# Patient Record
Sex: FEMALE | Race: WHITE | NOT HISPANIC OR LATINO | Employment: PART TIME | URBAN - METROPOLITAN AREA
[De-identification: names, ages, dates, MRNs, and addresses within clinical notes are randomized per-mention and may not be internally consistent; named-entity substitution may affect disease eponyms.]

---

## 2007-06-29 LAB
EXTERNAL HIV CONFIRMATION: NORMAL
EXTERNAL HIV SCREEN: NORMAL

## 2019-04-24 ENCOUNTER — OFFICE VISIT (OUTPATIENT)
Dept: GYNECOLOGY | Facility: CLINIC | Age: 34
End: 2019-04-24
Payer: COMMERCIAL

## 2019-04-24 VITALS
HEART RATE: 81 BPM | WEIGHT: 109 LBS | BODY MASS INDEX: 18.61 KG/M2 | SYSTOLIC BLOOD PRESSURE: 100 MMHG | DIASTOLIC BLOOD PRESSURE: 62 MMHG | HEIGHT: 64 IN

## 2019-04-24 DIAGNOSIS — Z01.419 ENCNTR FOR GYN EXAM (GENERAL) (ROUTINE) W/O ABN FINDINGS: Primary | ICD-10-CM

## 2019-04-24 DIAGNOSIS — Z30.011 ENCOUNTER FOR PRESCRIPTION OF ORAL CONTRACEPTIVES: ICD-10-CM

## 2019-04-24 PROCEDURE — G0145 SCR C/V CYTO,THINLAYER,RESCR: HCPCS | Performed by: NURSE PRACTITIONER

## 2019-04-24 PROCEDURE — 99385 PREV VISIT NEW AGE 18-39: CPT | Performed by: NURSE PRACTITIONER

## 2019-04-24 PROCEDURE — 87624 HPV HI-RISK TYP POOLED RSLT: CPT | Performed by: NURSE PRACTITIONER

## 2019-04-24 RX ORDER — DROSPIRENONE AND ETHINYL ESTRADIOL 0.02-3(28)
1 KIT ORAL DAILY
Qty: 84 TABLET | Refills: 3 | Status: SHIPPED | OUTPATIENT
Start: 2019-04-24 | End: 2020-08-20 | Stop reason: ALTCHOICE

## 2019-04-24 RX ORDER — CETIRIZINE HYDROCHLORIDE 10 MG/1
10 TABLET, CHEWABLE ORAL DAILY
COMMUNITY
End: 2020-04-27

## 2019-04-24 RX ORDER — DROSPIRENONE AND ETHINYL ESTRADIOL 0.02-3(28)
1 KIT ORAL DAILY
COMMUNITY
End: 2019-04-24 | Stop reason: SDUPTHER

## 2019-04-24 RX ORDER — LEVOTHYROXINE SODIUM 0.03 MG/1
25 TABLET ORAL
COMMUNITY
Start: 2018-08-09 | End: 2019-07-01 | Stop reason: SDUPTHER

## 2019-04-26 LAB
HPV HR 12 DNA CVX QL NAA+PROBE: NEGATIVE
HPV16 DNA CVX QL NAA+PROBE: NEGATIVE
HPV18 DNA CVX QL NAA+PROBE: NEGATIVE

## 2019-04-30 LAB
LAB AP GYN PRIMARY INTERPRETATION: NORMAL
Lab: NORMAL

## 2019-07-01 ENCOUNTER — LAB (OUTPATIENT)
Dept: LAB | Facility: CLINIC | Age: 34
End: 2019-07-01
Payer: COMMERCIAL

## 2019-07-01 ENCOUNTER — OFFICE VISIT (OUTPATIENT)
Dept: ENDOCRINOLOGY | Facility: CLINIC | Age: 34
End: 2019-07-01
Payer: COMMERCIAL

## 2019-07-01 VITALS
HEART RATE: 60 BPM | BODY MASS INDEX: 19.09 KG/M2 | DIASTOLIC BLOOD PRESSURE: 60 MMHG | WEIGHT: 111.8 LBS | HEIGHT: 64 IN | SYSTOLIC BLOOD PRESSURE: 100 MMHG

## 2019-07-01 DIAGNOSIS — E89.0 POSTOPERATIVE HYPOTHYROIDISM: Primary | ICD-10-CM

## 2019-07-01 DIAGNOSIS — C73 THYROID CANCER (HCC): ICD-10-CM

## 2019-07-01 DIAGNOSIS — E89.0 POSTOPERATIVE HYPOTHYROIDISM: ICD-10-CM

## 2019-07-01 LAB
T4 FREE SERPL-MCNC: 1.12 NG/DL (ref 0.76–1.46)
TSH SERPL DL<=0.05 MIU/L-ACNC: 1.89 UIU/ML (ref 0.36–3.74)

## 2019-07-01 PROCEDURE — 99204 OFFICE O/P NEW MOD 45 MIN: CPT | Performed by: INTERNAL MEDICINE

## 2019-07-01 PROCEDURE — 84439 ASSAY OF FREE THYROXINE: CPT

## 2019-07-01 PROCEDURE — 84443 ASSAY THYROID STIM HORMONE: CPT

## 2019-07-01 PROCEDURE — 36415 COLL VENOUS BLD VENIPUNCTURE: CPT

## 2019-07-01 RX ORDER — LEVOTHYROXINE SODIUM 0.03 MG/1
25 TABLET ORAL DAILY
Qty: 90 TABLET | Refills: 3 | Status: SHIPPED | OUTPATIENT
Start: 2019-07-01 | End: 2020-08-20 | Stop reason: SDUPTHER

## 2019-07-01 NOTE — PROGRESS NOTES
Massimo Dress 29 y o  female MRN: [id number]    Encounter: [id number]      Assessment/Plan     Assessment: This is a 29y o -year-old female with papillary thyroid cancer and postoperative hypothyroidism  Plan:  1  Papillary thyroid cancer status post left hemithyroidectomy-she has been having once a year ultrasound which I have ordered  There has been no evidence of recurrence on the ultrasound  Thyroglobulin level will be unreliable since she still has the right lobe of the thyroid present  2  Postoperative hypothyroidism-continue thyroid hormone supplementation  Check TSH and free T4       CC:   Thyroid cancer and postoperative hypothyroidism    History of Present Illness     HPI:  50-year-old woman with a 1 2 cm papillary thyroid cancer for which she had a left herbie thyroidectomy in March 2016  She did not have a completion thyroidectomy or radioactive iodine therapy  Since then, she has had postoperative hypothyroidism for which she is on levothyroxine 25 mcg per day  She denies any symptoms of hypo or hyperthyroidism  She denies any family history of thyroid cancer or personal history of radiation exposure  Review of Systems   Constitutional: Negative for chills and fever  HENT: Negative for trouble swallowing and voice change  Respiratory: Negative for shortness of breath  Cardiovascular: Negative for chest pain  Gastrointestinal: Negative for constipation, diarrhea, nausea and vomiting  Endocrine: Negative for cold intolerance and heat intolerance  All other systems reviewed and are negative  Historical Information   No past medical history on file    Past Surgical History:   Procedure Laterality Date    THYROID SURGERY       Social History   Social History     Substance and Sexual Activity   Alcohol Use Not Currently     Social History     Substance and Sexual Activity   Drug Use Never     Social History     Tobacco Use   Smoking Status Current Some Day Smoker Smokeless Tobacco Current User   Tobacco Comment    hooka daily in the summer, winter once per week     Family History:   Family History   Problem Relation Age of Onset    No Known Problems Mother     No Known Problems Father     No Known Problems Brother     No Known Problems Daughter     No Known Problems Son     No Known Problems Maternal Grandmother     No Known Problems Maternal Grandfather     No Known Problems Paternal Grandmother     No Known Problems Paternal Grandfather     No Known Problems Brother     No Known Problems Brother        Meds/Allergies   Current Outpatient Medications   Medication Sig Dispense Refill    cetirizine (ZyrTEC) 10 MG chewable tablet Chew 10 mg daily      drospirenone-ethinyl estradiol (BIANCA) 3-0 02 MG per tablet Take 1 tablet by mouth daily 84 tablet 3    levothyroxine 25 mcg tablet Take 25 mcg by mouth       No current facility-administered medications for this visit  Allergies   Allergen Reactions    Acetaminophen Hives    Ciprofloxacin Hives     To be further evaluated by allergy specialist per Dr David Jeffers       Objective   Vitals: Blood pressure 100/60, pulse 60, height 5' 4" (1 626 m), weight 50 7 kg (111 lb 12 8 oz)  Physical Exam   Constitutional: She is oriented to person, place, and time  She appears well-developed and well-nourished  No distress  HENT:   Head: Normocephalic and atraumatic  Mouth/Throat: Oropharynx is clear and moist and mucous membranes are normal  No oropharyngeal exudate  Eyes: Conjunctivae, EOM and lids are normal  Right eye exhibits no discharge  Left eye exhibits no discharge  No scleral icterus  Neck: Neck supple  No thyromegaly present  Left thyroid is absent  Cardiovascular: Normal rate, regular rhythm and normal heart sounds  Exam reveals no gallop and no friction rub  No murmur heard  Pulmonary/Chest: Effort normal and breath sounds normal  No respiratory distress  She has no wheezes  Abdominal: Soft  Bowel sounds are normal  She exhibits no distension  There is no tenderness  Musculoskeletal: Normal range of motion  She exhibits no edema, tenderness or deformity  Lymphadenopathy:        Head (right side): No occipital adenopathy present  Head (left side): No occipital adenopathy present  Right: No supraclavicular adenopathy present  Left: No supraclavicular adenopathy present  Neurological: She is alert and oriented to person, place, and time  No cranial nerve deficit  Skin: Skin is warm and intact  No rash noted  She is not diaphoretic  No erythema  Psychiatric: She has a normal mood and affect  Vitals reviewed  The history was obtained from the review of the chart, patient  I did reviewed the notes from her endocrinologist at [facility] which talks about her thyroid cancer and treatment of postoperative hypothyroidism  Imaging Studies:   Ultrasound of the thyroid done in 2018 shows no evidence of recurrence  I have personally reviewed pertinent reports  Portions of the record may have been created with voice recognition software  Occasional wrong word or "sound a like" substitutions may have occurred due to the inherent limitations of voice recognition software  Read the chart carefully and recognize, using context, where substitutions have occurred

## 2019-07-03 ENCOUNTER — OFFICE VISIT (OUTPATIENT)
Dept: FAMILY MEDICINE CLINIC | Facility: CLINIC | Age: 34
End: 2019-07-03
Payer: COMMERCIAL

## 2019-07-03 ENCOUNTER — TELEPHONE (OUTPATIENT)
Dept: ENDOCRINOLOGY | Facility: CLINIC | Age: 34
End: 2019-07-03

## 2019-07-03 VITALS
WEIGHT: 110 LBS | BODY MASS INDEX: 18.78 KG/M2 | SYSTOLIC BLOOD PRESSURE: 96 MMHG | HEIGHT: 64 IN | TEMPERATURE: 98.8 F | HEART RATE: 64 BPM | DIASTOLIC BLOOD PRESSURE: 68 MMHG | OXYGEN SATURATION: 96 %

## 2019-07-03 DIAGNOSIS — L85.3 DRY SKIN: ICD-10-CM

## 2019-07-03 DIAGNOSIS — Z00.00 HEALTH CARE MAINTENANCE: Primary | ICD-10-CM

## 2019-07-03 PROCEDURE — 99385 PREV VISIT NEW AGE 18-39: CPT | Performed by: FAMILY MEDICINE

## 2019-07-03 NOTE — TELEPHONE ENCOUNTER
----- Message from Iam Pena MD sent at 7/3/2019  9:27 AM EDT -----  The laboratory testing results are normal

## 2019-07-03 NOTE — PROGRESS NOTES
Assessment/Plan: pt here today for yearly PE     No problem-specific Assessment & Plan notes found for this encounter  Diagnoses and all orders for this visit:    Health care maintenance    Dry skin          Subjective:      Patient ID: Albertina Garcia is a 29 y o  female  First visit   works for [employer], [employer]  The following portions of the patient's history were reviewed and updated as appropriate: allergies, current medications, past family history, past medical history, past social history, past surgical history and problem list     Current Outpatient Medications:     cetirizine (ZyrTEC) 10 MG chewable tablet, Chew 10 mg daily, Disp: , Rfl:     drospirenone-ethinyl estradiol (BIANCA) 3-0 02 MG per tablet, Take 1 tablet by mouth daily, Disp: 84 tablet, Rfl: 3    levothyroxine 25 mcg tablet, Take 1 tablet (25 mcg total) by mouth daily, Disp: 90 tablet, Rfl: 3     Allergies   Allergen Reactions    Acetaminophen Hives    Ciprofloxacin Hives     To be further evaluated by allergy specialist per Dr Nnamdi Zavala       Review of Systems   Constitutional: Negative  HENT: Negative  Eyes: Negative  Respiratory: Negative  Cardiovascular: Negative  Gastrointestinal: Negative  Genitourinary: Negative  Musculoskeletal: Negative  Skin: Negative  Dry skin   Neurological: Negative  Psychiatric/Behavioral: Negative  Objective:      BP 96/68 (BP Location: Left arm, Patient Position: Sitting, Cuff Size: Standard)   Pulse 64   Temp 98 8 °F (37 1 °C) (Oral)   Ht 5' 4" (1 626 m)   Wt 49 9 kg (110 lb)   LMP 06/17/2019   SpO2 96%   BMI 18 88 kg/m²          Physical Exam   Constitutional: She is oriented to person, place, and time  She appears well-developed and well-nourished  HENT:   Head: Normocephalic and atraumatic     Right Ear: External ear normal    Left Ear: External ear normal    Nose: Nose normal    Mouth/Throat: Oropharynx is clear and moist    Eyes: Pupils are equal, round, and reactive to light  Conjunctivae and EOM are normal    Neck: Normal range of motion  Neck supple  Cardiovascular: Normal rate, regular rhythm, normal heart sounds and intact distal pulses  Pulmonary/Chest: Effort normal and breath sounds normal    Abdominal: Soft  Bowel sounds are normal    Neurological: She is alert and oriented to person, place, and time  She has normal reflexes  Skin: Skin is warm and dry  Skin little too dry  Psychiatric: She has a normal mood and affect   Her behavior is normal  Judgment and thought content normal

## 2019-08-08 ENCOUNTER — HOSPITAL ENCOUNTER (OUTPATIENT)
Dept: ULTRASOUND IMAGING | Facility: HOSPITAL | Age: 34
Discharge: HOME | End: 2019-08-08
Attending: INTERNAL MEDICINE
Payer: COMMERCIAL

## 2019-08-08 DIAGNOSIS — C73 THYROID CANCER (HCC): ICD-10-CM

## 2019-08-08 DIAGNOSIS — E89.0 POSTOPERATIVE HYPOTHYROIDISM: ICD-10-CM

## 2019-08-08 PROCEDURE — 76536 US EXAM OF HEAD AND NECK: CPT

## 2019-08-15 ENCOUNTER — TELEPHONE (OUTPATIENT)
Dept: ENDOCRINOLOGY | Facility: CLINIC | Age: 34
End: 2019-08-15

## 2019-08-15 NOTE — TELEPHONE ENCOUNTER
----- Message from Tally Leventhal, MD sent at 8/15/2019  7:52 AM EDT -----  Please call the patient regarding her abnormal result  There is no evidence of thyroid cancer on this ultrasound

## 2019-08-15 NOTE — RESULT ENCOUNTER NOTE
Please call the patient regarding her abnormal result  There is no evidence of thyroid cancer on this ultrasound

## 2019-09-10 ENCOUNTER — APPOINTMENT (OUTPATIENT)
Dept: LAB | Age: 34
End: 2019-09-10

## 2019-09-10 ENCOUNTER — TRANSCRIBE ORDERS (OUTPATIENT)
Dept: ADMINISTRATIVE | Facility: HOSPITAL | Age: 34
End: 2019-09-10

## 2019-09-10 DIAGNOSIS — Z00.8 HEALTH EXAMINATION IN POPULATION SURVEYS: Primary | ICD-10-CM

## 2019-09-10 DIAGNOSIS — Z00.8 HEALTH EXAMINATION IN POPULATION SURVEYS: ICD-10-CM

## 2019-09-10 LAB
CHOLEST SERPL-MCNC: 185 MG/DL (ref 50–200)
EST. AVERAGE GLUCOSE BLD GHB EST-MCNC: 94 MG/DL
HBA1C MFR BLD: 4.9 % (ref 4.2–6.3)
HDLC SERPL-MCNC: 72 MG/DL (ref 40–60)
LDLC SERPL CALC-MCNC: 100 MG/DL (ref 0–100)
NONHDLC SERPL-MCNC: 113 MG/DL
TRIGL SERPL-MCNC: 66 MG/DL

## 2019-09-10 PROCEDURE — 80061 LIPID PANEL: CPT

## 2019-09-10 PROCEDURE — 83036 HEMOGLOBIN GLYCOSYLATED A1C: CPT

## 2019-09-10 PROCEDURE — 36415 COLL VENOUS BLD VENIPUNCTURE: CPT

## 2019-10-16 ENCOUNTER — OFFICE VISIT (OUTPATIENT)
Dept: FAMILY MEDICINE CLINIC | Facility: CLINIC | Age: 34
End: 2019-10-16
Payer: COMMERCIAL

## 2019-10-16 VITALS
DIASTOLIC BLOOD PRESSURE: 72 MMHG | RESPIRATION RATE: 15 BRPM | WEIGHT: 111.6 LBS | SYSTOLIC BLOOD PRESSURE: 108 MMHG | BODY MASS INDEX: 19.05 KG/M2 | TEMPERATURE: 98.7 F | OXYGEN SATURATION: 98 % | HEART RATE: 81 BPM | HEIGHT: 64 IN

## 2019-10-16 DIAGNOSIS — R42 ORTHOSTATIC DIZZINESS: Primary | ICD-10-CM

## 2019-10-16 DIAGNOSIS — R42 LIGHT-HEADED FEELING: ICD-10-CM

## 2019-10-16 PROCEDURE — 3008F BODY MASS INDEX DOCD: CPT | Performed by: FAMILY MEDICINE

## 2019-10-16 PROCEDURE — 99213 OFFICE O/P EST LOW 20 MIN: CPT | Performed by: FAMILY MEDICINE

## 2019-10-16 NOTE — PROGRESS NOTES
Chief Complaint   Patient presents with    Letter for School/Work     would like excuse stating she can only work in her department becuase of dizziness when running around       Assessment/Plan:  Will give note for one month with no repetitive bending and lifting  Cardiology evaluation  Diagnoses and all orders for this visit:    Orthostatic dizziness  -     Ambulatory referral to Cardiology; Future    Light-headed feeling          Subjective:      Patient ID: Peter Davis is a 29 y o  female  Here for dizziness with bending over and picking things up, repetitively  Very difficult historian of when exactly started  Bending over and picking things up patient gets lightheaded  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  The following portions of the patient's history were reviewed and updated as appropriate: allergies, current medications, past family history, past medical history, past social history, past surgical history and problem list     Review of Systems   Constitutional: Negative  HENT: Negative  Eyes: Negative  Respiratory: Negative  Negative for apnea, chest tightness and shortness of breath  Cardiovascular: Negative  Negative for chest pain, palpitations and leg swelling  Gastrointestinal: Negative  Genitourinary: Negative  Musculoskeletal: Negative  Skin: Negative  Neurological:        Gets light headed with repetitive bending and lifting  Psychiatric/Behavioral: Negative            Objective:      /72 (BP Location: Left arm, Patient Position: Sitting, Cuff Size: Standard)   Pulse 81   Temp 98 7 °F (37 1 °C) (Oral)   Resp 15   Ht 5' 4" (1 626 m)   Wt 50 6 kg (111 lb 9 6 oz)   SpO2 98%   BMI 19 16 kg/m²       Current Outpatient Medications:     cetirizine (ZyrTEC) 10 MG chewable tablet, Chew 10 mg daily, Disp: , Rfl:     drospirenone-ethinyl estradiol (BIANCA) 3-0 02 MG per tablet, Take 1 tablet by mouth daily, Disp: 84 tablet, Rfl: 3    levothyroxine 25 mcg tablet, Take 1 tablet (25 mcg total) by mouth daily, Disp: 90 tablet, Rfl: 3     Physical Exam

## 2019-12-09 ENCOUNTER — OFFICE VISIT (OUTPATIENT)
Dept: GYNECOLOGY | Facility: CLINIC | Age: 34
End: 2019-12-09
Payer: COMMERCIAL

## 2019-12-09 VITALS
SYSTOLIC BLOOD PRESSURE: 94 MMHG | BODY MASS INDEX: 19.47 KG/M2 | WEIGHT: 113.4 LBS | DIASTOLIC BLOOD PRESSURE: 70 MMHG | HEART RATE: 90 BPM

## 2019-12-09 DIAGNOSIS — N90.89 VULVAR IRRITATION: Primary | ICD-10-CM

## 2019-12-09 PROCEDURE — 99214 OFFICE O/P EST MOD 30 MIN: CPT | Performed by: NURSE PRACTITIONER

## 2019-12-09 PROCEDURE — 87210 SMEAR WET MOUNT SALINE/INK: CPT | Performed by: NURSE PRACTITIONER

## 2019-12-09 RX ORDER — NYSTATIN AND TRIAMCINOLONE ACETONIDE 100000; 1 [USP'U]/G; MG/G
OINTMENT TOPICAL 2 TIMES DAILY
Qty: 30 G | Refills: 0 | Status: SHIPPED | OUTPATIENT
Start: 2019-12-09 | End: 2020-04-27

## 2019-12-09 NOTE — PATIENT INSTRUCTIONS
Use ointment to affected area  Encourage  to discontinue powder to groin area or shower before sex to limit exposure  Bathe daily with dove bar soap  Call with any recurrence of symptoms

## 2019-12-09 NOTE — PROGRESS NOTES
Assessment/Plan:     Use ointment to affected area  Encourage  to discontinue powder to groin area or shower before sex to limit exposure  Bathe daily with dove bar soap  Call with any recurrence of symptoms  Diagnoses and all orders for this visit:    Vulvar irritation  -     POCT wet mount  -     nystatin-triamcinolone (MYCOLOG-II) ointment; Apply topically 2 (two) times a day              Subjective:      Patient ID: Alla Andres is a 36 y o  female  Alla Andres is a 36 y o  female who is here today for problem visit  She has had external vaginal itching x 5 weeks  She is using monistat external cream to help resolve the itching  If she discontinues the monistat, the itching immediately returns  Denies new soaps of detergents  No health concerns  Monthly menses x 4 days with light flow  Menses is acceptable  Alla Andres is sexually active with   Her vulvar irritation worsens after sex  Denies using any vaginal lubricants  C/o light yellow vaginal discharge x 3-4 weeks  The following portions of the patient's history were reviewed and updated as appropriate: allergies, current medications, past family history, past medical history, past social history, past surgical history and problem list     Review of Systems   Constitutional: Negative  Gastrointestinal: Negative for abdominal pain, constipation, diarrhea, nausea and vomiting  Genitourinary: Positive for vaginal discharge  Negative for decreased urine volume, dyspareunia, dysuria, genital sores, menstrual problem, pelvic pain, urgency, vaginal bleeding and vaginal pain  Musculoskeletal: Negative for arthralgias and myalgias  Skin: Negative  Hematological: Negative for adenopathy  Psychiatric/Behavioral: Negative  All other systems reviewed and are negative          Objective:      BP 94/70 (BP Location: Left arm, Patient Position: Sitting, Cuff Size: Standard)   Pulse 90   Wt 51 4 kg (113 lb 6 4 oz) LMP 11/15/2019   BMI 19 47 kg/m²          Physical Exam   Constitutional: She is oriented to person, place, and time  She appears well-developed and well-nourished  Genitourinary: Vagina normal and uterus normal  Rectal exam shows no external hemorrhoid  Pelvic exam was performed with patient supine  No labial fusion  There is tenderness on the right labia  There is no rash, lesion or injury on the right labia  There is tenderness on the left labia  There is no rash, lesion or injury on the left labia  Cervix exhibits no motion tenderness, no discharge and no friability  Right adnexum displays no mass, no tenderness and no fullness  Left adnexum displays no mass, no tenderness and no fullness  Genitourinary Comments: Tenderness with moist appearing skin as noted on diagram  Linear excoriation at base of vagina   Musculoskeletal: Normal range of motion  Lymphadenopathy: No inguinal adenopathy noted on the right or left side  Right: No inguinal adenopathy present  Left: No inguinal adenopathy present  Neurological: She is alert and oriented to person, place, and time  Skin: Skin is warm and dry  Psychiatric: She has a normal mood and affect  Nursing note and vitals reviewed

## 2020-01-07 ENCOUNTER — OFFICE VISIT (OUTPATIENT)
Dept: FAMILY MEDICINE CLINIC | Facility: CLINIC | Age: 35
End: 2020-01-07
Payer: COMMERCIAL

## 2020-01-07 VITALS
DIASTOLIC BLOOD PRESSURE: 72 MMHG | HEIGHT: 64 IN | TEMPERATURE: 99 F | SYSTOLIC BLOOD PRESSURE: 94 MMHG | BODY MASS INDEX: 19.33 KG/M2 | HEART RATE: 75 BPM | OXYGEN SATURATION: 99 % | RESPIRATION RATE: 16 BRPM | WEIGHT: 113.2 LBS

## 2020-01-07 DIAGNOSIS — N89.8 VAGINAL ITCHING: Primary | ICD-10-CM

## 2020-01-07 DIAGNOSIS — L85.3 DRY SKIN DERMATITIS: ICD-10-CM

## 2020-01-07 PROCEDURE — 99213 OFFICE O/P EST LOW 20 MIN: CPT | Performed by: FAMILY MEDICINE

## 2020-01-07 RX ORDER — FLUCONAZOLE 150 MG/1
150 TABLET ORAL ONCE
Qty: 1 TABLET | Refills: 1 | Status: SHIPPED | OUTPATIENT
Start: 2020-01-07 | End: 2020-01-07

## 2020-01-07 NOTE — PROGRESS NOTES
Chief Complaint   Patient presents with    Rash     has on arms, abdomen, and back, feels itchy    Vaginal Itching       Assessment/Plan:  Reviewed dry skin care  Discussed soaps on dry skin and long hot showers  Diagnoses and all orders for this visit:    Vaginal itching  -     fluconazole (DIFLUCAN) 150 mg tablet; Take 1 tablet (150 mg total) by mouth once for 1 dose Can repeat in 3 - 4 days if needed  Dry skin dermatitis          Subjective:      Patient ID: Vahe Saleh is a 36 y o  female  Dry itch spots on skin, head to toe  Changed body wasy 3 weeks ago and riash with itching started 2 weeks ago  Also complained of vaginal itching, history of yeast infections  Also washes up inside with soap  SH: Lives and sleeps with  who has no rash  The following portions of the patient's history were reviewed and updated as appropriate: allergies, current medications, past medical history, past social history and problem list     Review of Systems   Constitutional: Negative  HENT: Negative  Eyes: Negative  Respiratory: Negative  Cardiovascular: Negative  Gastrointestinal: Negative  Genitourinary: Negative  Negative for vaginal bleeding, vaginal discharge and vaginal pain  Musculoskeletal: Negative  Skin: Positive for rash  Neurological: Negative  Psychiatric/Behavioral: Negative            Objective:      BP 94/72 (BP Location: Left arm, Patient Position: Sitting, Cuff Size: Standard)   Pulse 75   Temp 99 °F (37 2 °C) (Oral)   Resp 16   Ht 5' 4" (1 626 m)   Wt 51 3 kg (113 lb 3 2 oz)   SpO2 99%   BMI 19 43 kg/m²       Current Outpatient Medications:     cetirizine (ZyrTEC) 10 MG chewable tablet, Chew 10 mg daily, Disp: , Rfl:     drospirenone-ethinyl estradiol (BIANCA) 3-0 02 MG per tablet, Take 1 tablet by mouth daily, Disp: 84 tablet, Rfl: 3    levothyroxine 25 mcg tablet, Take 1 tablet (25 mcg total) by mouth daily, Disp: 90 tablet, Rfl: 3   nystatin-triamcinolone (MYCOLOG-II) ointment, Apply topically 2 (two) times a day (Patient not taking: Reported on 1/7/2020), Disp: 30 g, Rfl: 0     Physical Exam   Constitutional: She is oriented to person, place, and time  She appears well-developed and well-nourished  HENT:   Head: Normocephalic and atraumatic  Right Ear: External ear normal    Left Ear: External ear normal    Nose: Nose normal    Mouth/Throat: Oropharynx is clear and moist    Eyes: Pupils are equal, round, and reactive to light  Conjunctivae and EOM are normal    Neck: Normal range of motion  Neck supple  Cardiovascular: Normal rate, regular rhythm, normal heart sounds and intact distal pulses  Pulmonary/Chest: Effort normal and breath sounds normal    Abdominal: Soft  Bowel sounds are normal    Neurological: She is alert and oriented to person, place, and time  She has normal reflexes  Skin: Skin is warm and dry  Rash noted  Small red areas of irritated skin  Skin is too dry  Psychiatric: She has a normal mood and affect   Her behavior is normal  Judgment and thought content normal

## 2020-01-16 ENCOUNTER — OFFICE VISIT (OUTPATIENT)
Dept: URGENT CARE | Facility: MEDICAL CENTER | Age: 35
End: 2020-01-16
Payer: COMMERCIAL

## 2020-01-16 VITALS
BODY MASS INDEX: 20.69 KG/M2 | TEMPERATURE: 97.6 F | RESPIRATION RATE: 18 BRPM | HEIGHT: 62 IN | DIASTOLIC BLOOD PRESSURE: 72 MMHG | WEIGHT: 112.43 LBS | HEART RATE: 88 BPM | OXYGEN SATURATION: 99 % | SYSTOLIC BLOOD PRESSURE: 109 MMHG

## 2020-01-16 DIAGNOSIS — R21 RASH: Primary | ICD-10-CM

## 2020-01-16 PROCEDURE — 99213 OFFICE O/P EST LOW 20 MIN: CPT | Performed by: PHYSICIAN ASSISTANT

## 2020-01-16 RX ORDER — METHYLPREDNISOLONE 4 MG/1
TABLET ORAL
Qty: 1 EACH | Refills: 0 | Status: SHIPPED | OUTPATIENT
Start: 2020-01-16 | End: 2020-04-27

## 2020-01-17 NOTE — PATIENT INSTRUCTIONS
Take steroid as prescribed  Use steroid cream  Take 1 tablet of diflucan  Follow up with PCP in 5-7 days after steroid is done if rash is still there and no better  Follow up with Dermatology for biopsy of rash  Go to the ER if symptoms become severe  Acute Rash   WHAT YOU NEED TO KNOW:   A rash is irritation, redness, or itchiness in the skin or mucus membranes  Mucus membranes are areas such as the lining of your nose or throat  Acute means the rash starts suddenly, worsens quickly, and lasts a short time  An acute rash may be caused by a disease, such as hepatitis or vasculitis  The rash may be a reaction to something you are allergic to, such as certain foods, or latex  Certain medicines, including antibiotics, NSAIDs, prescription pain medicine, and aspirin can also cause a rash  DISCHARGE INSTRUCTIONS:   Return to the emergency department if:   · You have sudden trouble breathing or chest pain  · You are vomiting, have a headache or muscle aches, and your throat hurts  Contact your healthcare provider if:   · You have a fever  · You get open wounds from scratching your skin, or you have a wound that is red, swollen, or painful  · Your rash lasts longer than 3 months  · You have swelling or pain in your joints  · You have questions or concerns about your condition or care  Medicines:  If your rash does not go away on its own, you may need the following medicines:  · Antihistamines  may be given to help decrease itching  · Steroids  may be given to decrease inflammation  · Antibiotics  help fight or prevent a bacterial infection  · Take your medicine as directed  Contact your healthcare provider if you think your medicine is not helping or if you have side effects  Tell him of her if you are allergic to any medicine  Keep a list of the medicines, vitamins, and herbs you take  Include the amounts, and when and why you take them   Bring the list or the pill bottles to follow-up visits  Carry your medicine list with you in case of an emergency  Prevent a rash or care for your skin when you have a rash:  Dry skin can lead to more problems  Do not scratch your skin if it itches  You may cause a skin infection by scratching  The following may prevent dry skin, and help your skin look better:  · Use thick cream lotions or petroleum jelly to help soothe your rash  These products work well on areas with thick skin, such as your feet  Cool compresses may also be used to soothe your skin  Apply a cool compress or a cool, wet towel, and then cover it with a dry towel  · Use lukewarm water when you bathe  Hot water may damage your skin more  Pat your skin dry  Do not rub your skin with a towel  · Use detergents, soaps, shampoos, and bubble baths made for sensitive skin  Wear clothes that are made of cotton instead of nylon or wool  Cotton is softer, so it will not hurt your skin as much  Follow up with your healthcare provider as directed: You may need to see a dermatologist if healthcare providers do not know what is causing your rash  You may also need to see a dermatologist if your rash does not get better even with treatment  You may need to see a dietitian if you have allergies to foods  Write down your questions so you remember to ask them during your visits  © 2017 [employer] Information is for End User's use only and may not be sold, redistributed or otherwise used for commercial purposes  All illustrations and images included in CareNotes® are the copyrighted property of [employer]  or [employer]  The above information is an  only  It is not intended as medical advice for individual conditions or treatments  Talk to your doctor, nurse or pharmacist before following any medical regimen to see if it is safe and effective for you

## 2020-01-17 NOTE — PROGRESS NOTES
[facility] [facility]        NAME: Basil Cannon is a 36 y o  female  : 1985    MRN: [id number]  DATE: 2020  TIME: 10:29 PM    Assessment and Plan   Rash [R21]  1  Rash  methylPREDNISolone 4 MG tablet therapy pack    hydrocortisone 2 5 % cream     Assessment/plan:  1  Rash  - patient has had rash for 3 weeks now  Rash was palpable on skin  Some lesions appear circular with a slight central clearing  Pt denies new detergents, new soaps, different diet, new lotions, etc  Pt states the rash is slightly itchy  A couple ddx come to mind  The first is a contact dermatitis  Second, is pityriasis rosea which is a viral rash and will go away in a couple weeks on its own  Possibly could be pityriasis rosea with the description of one large lesion first then the diffuse rash  Lastly, with the circular lesion, it could be funal like a tinea corporis  Prescribed oral steroids and topical steroids for the patient  Patient has seen 3 different providers for the rash  No one has been able to tell her what it is  Pt also states she has diflucan at home from her OBGYN  Advised pt to take the diflucan in case the rash is fungal  Follow up with PCP in 5-7 days after steroid is done if rash is still there and no better  Follow up with Dermatology for biopsy of rash  Go to the ER if symptoms become severe  Patient Instructions       Follow up with PCP in 3-5 days  Proceed to  ER if symptoms worsen  Chief Complaint     Chief Complaint   Patient presents with    Rash     Scattered rash on trunk legs and arms  Onset 3 weeks ago  Denies pain or itching with rash         History of Present Illness       Rash   This is a new (pt states the rash started with one big spot and then spread to the rest of her body) problem  The current episode started 1 to 4 weeks ago (3 weeks )  The rash is diffuse  The rash is characterized by itchiness and redness  Associated symptoms include shortness of breath   Pertinent negatives include no congestion, cough, diarrhea, facial edema, joint pain, rhinorrhea, sore throat or vomiting  Treatments tried: aveeno  The treatment provided no relief  There is no history of allergies, asthma or eczema  Review of Systems   Review of Systems   HENT: Negative for congestion, ear discharge, ear pain, facial swelling, rhinorrhea and sore throat  Respiratory: Positive for shortness of breath  Negative for apnea, cough, choking, chest tightness, wheezing and stridor  Cardiovascular: Negative for chest pain, palpitations and leg swelling  Gastrointestinal: Negative for abdominal distention, abdominal pain, diarrhea, nausea and vomiting  Musculoskeletal: Negative for joint pain  Skin: Positive for rash (positive for diffuse skin rash)  Negative for color change and pallor  Neurological: Negative for dizziness, syncope, speech difficulty, weakness, light-headedness and headaches           Current Medications       Current Outpatient Medications:     drospirenone-ethinyl estradiol (BIANCA) 3-0 02 MG per tablet, Take 1 tablet by mouth daily, Disp: 84 tablet, Rfl: 3    levothyroxine 25 mcg tablet, Take 1 tablet (25 mcg total) by mouth daily, Disp: 90 tablet, Rfl: 3    cetirizine (ZyrTEC) 10 MG chewable tablet, Chew 10 mg daily, Disp: , Rfl:     hydrocortisone 2 5 % cream, Apply topically 2 (two) times a day, Disp: 30 g, Rfl: 0    methylPREDNISolone 4 MG tablet therapy pack, Use as directed on package, Disp: 1 each, Rfl: 0    nystatin-triamcinolone (MYCOLOG-II) ointment, Apply topically 2 (two) times a day (Patient not taking: Reported on 1/7/2020), Disp: 30 g, Rfl: 0    Current Allergies     Allergies as of 01/16/2020 - Reviewed 01/16/2020   Allergen Reaction Noted    Acetaminophen Hives 04/24/2019    Ciprofloxacin Hives 09/14/2017            The following portions of the patient's history were reviewed and updated as appropriate: allergies, current medications, past family history, past medical history, past social history, past surgical history and problem list      History reviewed  No pertinent past medical history  Past Surgical History:   Procedure Laterality Date    THYROID SURGERY         Family History   Problem Relation Age of Onset    No Known Problems Mother     No Known Problems Father     No Known Problems Brother     No Known Problems Daughter     No Known Problems Son     No Known Problems Maternal Grandmother     No Known Problems Maternal Grandfather     No Known Problems Paternal Grandmother     No Known Problems Paternal Grandfather     No Known Problems Brother     No Known Problems Brother          Medications have been verified  Objective   /72   Pulse 88   Temp 97 6 °F (36 4 °C) (Tympanic)   Resp 18   Ht 5' 2" (1 575 m)   Wt 51 kg (112 lb 7 oz)   LMP 01/12/2020 (Exact Date)   SpO2 99%   BMI 20 56 kg/m²        Physical Exam     Physical Exam   Constitutional: She is oriented to person, place, and time  She appears well-developed and well-nourished  No distress  Cardiovascular: Normal rate, regular rhythm, normal heart sounds and intact distal pulses  No murmur heard  Pulmonary/Chest: Effort normal and breath sounds normal  No stridor  No respiratory distress  She has no wheezes  She has no rales  She exhibits no tenderness  Neurological: She is alert and oriented to person, place, and time  Skin: Skin is warm and dry  Rash noted  No purpura noted  Rash is maculopapular  Rash is not nodular and not vesicular  She is not diaphoretic  No erythema  No pallor  Erythematous, maculopapular rash in circular cluster with slight central clearing and some skin peeling noted in diffuse pattern    Circular clusters were all different sizes  Pt relates the first one was on her wrist and then the rash spread    Rash located on chest, abdomen, whole back, upper inner thighs, and both arms  Psychiatric: She has a normal mood and affect   Her behavior is normal    Nursing note and vitals reviewed

## 2020-03-09 ENCOUNTER — TELEPHONE (OUTPATIENT)
Dept: DERMATOLOGY | Facility: CLINIC | Age: 35
End: 2020-03-09

## 2020-03-09 NOTE — TELEPHONE ENCOUNTER
Mr  Natalie Kam called in for his wife wanting to make an appointment foe her as a new patient   He was told there is a wait list, he asked to place her on that wait list

## 2020-04-13 ENCOUNTER — TELEMEDICINE (OUTPATIENT)
Dept: DERMATOLOGY | Facility: CLINIC | Age: 35
End: 2020-04-13
Payer: COMMERCIAL

## 2020-04-13 DIAGNOSIS — L70.0 ACNE VULGARIS: Primary | ICD-10-CM

## 2020-04-13 PROCEDURE — 99203 OFFICE O/P NEW LOW 30 MIN: CPT | Performed by: STUDENT IN AN ORGANIZED HEALTH CARE EDUCATION/TRAINING PROGRAM

## 2020-04-13 RX ORDER — DOXYCYCLINE HYCLATE 100 MG/1
CAPSULE ORAL
Qty: 60 CAPSULE | Refills: 0 | Status: SHIPPED | OUTPATIENT
Start: 2020-04-13 | End: 2020-04-27

## 2020-04-14 ENCOUNTER — TELEPHONE (OUTPATIENT)
Dept: DERMATOLOGY | Facility: CLINIC | Age: 35
End: 2020-04-14

## 2020-04-27 ENCOUNTER — TELEMEDICINE (OUTPATIENT)
Dept: GYNECOLOGY | Facility: CLINIC | Age: 35
End: 2020-04-27
Payer: COMMERCIAL

## 2020-04-27 VITALS — BODY MASS INDEX: 18.32 KG/M2 | WEIGHT: 114 LBS | HEIGHT: 66 IN

## 2020-04-27 DIAGNOSIS — L29.2 VULVAR ITCHING: ICD-10-CM

## 2020-04-27 DIAGNOSIS — Z91.14 NON COMPLIANCE W MEDICATION REGIMEN: ICD-10-CM

## 2020-04-27 DIAGNOSIS — Z30.09 ENCOUNTER FOR COUNSELING REGARDING CONTRACEPTION: Primary | ICD-10-CM

## 2020-04-27 DIAGNOSIS — N89.8 VAGINAL DISCHARGE: ICD-10-CM

## 2020-04-27 PROBLEM — Z91.148 NON COMPLIANCE W MEDICATION REGIMEN: Status: ACTIVE | Noted: 2020-04-27

## 2020-04-27 PROCEDURE — 99213 OFFICE O/P EST LOW 20 MIN: CPT | Performed by: NURSE PRACTITIONER

## 2020-04-27 RX ORDER — FLUCONAZOLE 150 MG/1
150 TABLET ORAL ONCE
Qty: 1 TABLET | Refills: 0 | Status: SHIPPED | OUTPATIENT
Start: 2020-04-27 | End: 2020-04-27

## 2020-06-11 ENCOUNTER — TELEPHONE (OUTPATIENT)
Dept: ENDOCRINOLOGY | Facility: CLINIC | Age: 35
End: 2020-06-11

## 2020-06-16 ENCOUNTER — TELEPHONE (OUTPATIENT)
Dept: ENDOCRINOLOGY | Facility: CLINIC | Age: 35
End: 2020-06-16

## 2020-07-21 ENCOUNTER — TELEPHONE (OUTPATIENT)
Dept: ENDOCRINOLOGY | Facility: CLINIC | Age: 35
End: 2020-07-21

## 2020-07-21 DIAGNOSIS — C73 THYROID CANCER (HCC): Primary | ICD-10-CM

## 2020-07-23 ENCOUNTER — HOSPITAL ENCOUNTER (OUTPATIENT)
Dept: ULTRASOUND IMAGING | Facility: HOSPITAL | Age: 35
Discharge: HOME | End: 2020-07-23
Attending: INTERNAL MEDICINE
Payer: COMMERCIAL

## 2020-07-23 ENCOUNTER — TELEPHONE (OUTPATIENT)
Dept: ENDOCRINOLOGY | Facility: CLINIC | Age: 35
End: 2020-07-23

## 2020-07-23 DIAGNOSIS — C73 THYROID CANCER (HCC): Primary | ICD-10-CM

## 2020-07-23 DIAGNOSIS — C73 THYROID CANCER (HCC): ICD-10-CM

## 2020-07-23 PROCEDURE — 76536 US EXAM OF HEAD AND NECK: CPT

## 2020-07-27 ENCOUNTER — TELEPHONE (OUTPATIENT)
Dept: ENDOCRINOLOGY | Facility: CLINIC | Age: 35
End: 2020-07-27

## 2020-07-27 NOTE — RESULT ENCOUNTER NOTE
Left thyroid lobe is absent  Right thyroid does not having nodules  There is no evidence of recurrence or metastatic disease in this ultrasound      Sent to my chart

## 2020-07-27 NOTE — TELEPHONE ENCOUNTER
----- Message from Gagan Gifford MD sent at 7/27/2020 12:11 PM EDT -----  Left thyroid lobe is absent  Right thyroid does not having nodules  There is no evidence of recurrence or metastatic disease in this ultrasound      Sent to my chart

## 2020-07-29 ENCOUNTER — OFFICE VISIT (OUTPATIENT)
Dept: DERMATOLOGY | Facility: CLINIC | Age: 35
End: 2020-07-29
Payer: COMMERCIAL

## 2020-07-29 VITALS — BODY MASS INDEX: 20.04 KG/M2 | WEIGHT: 117.4 LBS | HEIGHT: 64 IN | TEMPERATURE: 99.1 F

## 2020-07-29 DIAGNOSIS — L70.0 ACNE VULGARIS: ICD-10-CM

## 2020-07-29 PROCEDURE — 99212 OFFICE O/P EST SF 10 MIN: CPT | Performed by: STUDENT IN AN ORGANIZED HEALTH CARE EDUCATION/TRAINING PROGRAM

## 2020-07-29 NOTE — PROGRESS NOTES
[facility] Follow Up Note    Patient Name: Emmett Mosley  Encounter Date: 7/29/2020    Today's Chief Concerns:  Osmar Concern #1:  Follow up for acne      Current Medications:    Current Outpatient Medications:     levothyroxine 25 mcg tablet, Take 1 tablet (25 mcg total) by mouth daily, Disp: 90 tablet, Rfl: 3    tretinoin (RETIN-A) 0 025 % cream, Apply pea sized amount topically to face one hour before bedtime  , Disp: 45 g, Rfl: 6    drospirenone-ethinyl estradiol (BIANCA) 3-0 02 MG per tablet, Take 1 tablet by mouth daily (Patient not taking: Reported on 7/29/2020), Disp: 84 tablet, Rfl: 3    CONSTITUTIONAL:   Vitals:    07/29/20 1042   Temp: 99 1 °F (37 3 °C)   TempSrc: Tympanic   Weight: 53 3 kg (117 lb 6 4 oz)   Height: 5' 4" (1 626 m)         Specific Alerts:    Have you been seen by a [facility]atologist in the last 3 years? No    Are you pregnant or planning to become pregnant? No    Are you currently or planning to be nursing or breast feeding? No    Allergies   Allergen Reactions    Acetaminophen Hives    Ciprofloxacin Hives     To be further evaluated by allergy specialist per Dr Ash Emmanuel we call your Preferred Phone number to discuss your specific medical information? YES    May we leave a detailed message that includes your specific medical information? YES    Have you traveled outside of the [facility] in the past 3 months? No    Do you currently have a pacemaker or defibrillator? No    Do you have any artificial heart valves, joints, plates, screws, rods, stents, pins, etc? No    Do you require any medications prior to a surgical procedure? No    Are you taking any medications that cause you to bleed more easily ("blood thinners") No    Have you ever experienced a rapid heartbeat with epinephrine? No    Have you ever been treated with "gold" (gold sodium thiomalate) therapy?      [facility] can help with wrinkles, "laugh lines," facial volume loss, "double chin," "love handles," age spots, and more  Are you interested in learning today about some of the skin enhancement procedures that we offer? (If Yes, please provide more detail)     Review of Systems:  Have you recently had or currently have any of the following?     · Fever or chills: No  · Night Sweats: No  · Headaches: YES after she gets her period  · Weight Gain: No  · Weight Loss: No  · Blurry Vision: No  · Nausea: No  · Vomiting: No  · Diarrhea: No  · Blood in Stool: No  · Abdominal Pain: No  · Itchy Skin: No  · Painful Joints: No  · Swollen Joints: No  · Muscle Pain: No  · Irregular Mole: No  · Sun Burn: No  · Dry Skin: No  · Skin Color Changes: No  · Scar or Keloid: No  · Cold Sores/Fever Blisters: No  · Bacterial Infections/MRSA: No  · Anxiety: No  · Depression: No  · Suicidal or Homicidal Thoughts: No    PSYCH: Normal mood and affect  EYES: Normal conjunctiva  ENT: Normal lips and oral mucosa  CARDIOVASCULAR: No edema  RESPIRATORY: Normal respirations  HEME/LYMPH/IMMUNO:  No regional lymphadenopathy except as noted below in ASSESSMENT AND PLAN BY DIAGNOSIS    FULL ORGAN SYSTEM SKIN EXAM (SKIN)  Hair, Scalp, Ears, Face Normal except as noted below in Assessment   Neck, Cervical Chain Nodes Normal except as noted below in Assessment   Right Arm/Hand/Fingers Normal except as noted below in Assessment   Left Arm/Hand/Fingers Normal except as noted below in Assessment   Chest/Breasts/Axillae Viewed areas Normal except as noted below in Assessment   Abdomen, Umbilicus Normal except as noted below in Assessment   Back/Spine Normal except as noted below in Assessment   Groin/Genitalia/Buttocks Viewed areas Normal except as noted below in Assessment   Right Leg, Foot, Toes Normal except as noted below in Assessment   Left Leg, Foot, Toes Normal except as noted below in Assessment         ACNE VULGARIS ("COMMON ACNE"): FOLLOW UP    Physical Exam:   Psychiatric/Mood: Normal    Anatomic Location Affected: Face   Morphological Description:  o Open/Closed Comedones:  - Few ("Mild")  o Inflammatory Papules/Pustules:  - No evidence ("Clear")  o Nodules:  - No evidence ("Clear")  o Scarring:  - No evidence ("Clear")  o Excoriations:  - No evidence ("Clear")  o Local Skin Redness/Erythema:  - No evidence ("Clear")  o Local Skin Dryness/Scaling:  - No evidence ("Clear")  o Local Skin Dyspigmentation:  - No evidence ("Clear")   Pertinent Positives:   Pertinent Negatives: Additional History of Present Condition:     Previous Treatment Plan:  Tretinoin, BP wash   How compliant are you with the prescribed plan?: compliant   Did you experience any side effects of treatment: No   Are you happy with the improvement: YEs    Assessment and Plan:   We reviewed the causes of acne, the kinds of acne, and the expected clinical course   We discussed treatment options ranging from over-the-counter products, topical retinoids, antibiotics, BP, hormonal therapies (OCPs/spironolactone), and isotretinoin (Accutane)   We reviewed specific over-the-counter interventions and medications  Recommended typical hygiene measures including water-based facial products, washing regularly with mild cleanser, and refraining from picking and popping any pimples   Recommended non-comedogenic sunscreen use daily   Expectations of therapy discussed  Side effects, risks and benefits of medications discussed   A comprehensive handout on Acne was provided   The phone number to call in case of questions or concerns (and instructions to stop medications in such a scenario) was provided     After lengthy discussion of etiology and treatment options, we decided to implement the following personalized treatment plan:    Based on a thorough discussion of this condition and the management approach to it (including a comprehensive discussion of the known risks, side effects and potential benefits of treatment), the patient (family) agrees to implement the following specific plan:    --------------------------------------------------------------------------------------  [id number] [address]    1) SKIN HYGEINE:  In the shower, wash your face, chest and back gently with Cetaphil moisturizing cleanser or Dove Fragrance-free bar  Do not use a luffa or washcloth as these tend to be too irritating to acne-prone skin  2) ANTIMICROBIAL BENZOYL PEROXIDE:Neutrogena Clear Pore (Benzoyl Peroxide 3% wash): In the shower, apply this medication to your face, chest and back  Leave this wash on your skin for about 5 minutes and then rinse it off completely while in the shower  If you do not rinse it off completely, then it will bleach your towels or clothing  This medication is now available without prescription (over-the-counter) in most drug stores or at [employer] [facility] for about $7 a bottle  (CONTINUE)     3)         Use Over the counter  ELTA MD sun screen protection  May continue using over the counter TREVON BROWN cream       EVENING ROUTINE    1) SKIN HYGEINE:  In the shower, wash your face, chest and back gently with Cetaphil moisturizing cleanser or Dove Fragrance-free bar  Do not use a lufa or washcloth as these tend to be too irritating to acne-prone skin  2) TOPICAL RETINOID:  At 1 hour before bedtime (after washing your face and allowing the skin to completely dry), spread only a single pea-sized amount of this medication evenly over your entire face (avoiding your eyes or mouth):   Tretinoin 0 025% cream 1 hour before bedtime ( Continue )              REMEMBER:  Always take your acne pills with lots of water! A pill stuck in your throat can cause significant burning and irritation  Drink a full glass of water to ensure the pill gets into your stomach  Avoid popping a pill right before bed, and stay upright for at least 1 hour after taking a pill            Scribe Attestation    I,:   Nam Venegas Avis Kendall am acting as a scribe while in the presence of the attending physician :        I,:   Vivian Parisi MD personally performed the services described in this documentation    as scribed in my presence :

## 2020-08-05 ENCOUNTER — LAB (OUTPATIENT)
Dept: LAB | Age: 35
End: 2020-08-05
Payer: COMMERCIAL

## 2020-08-05 DIAGNOSIS — C73 THYROID CANCER (HCC): ICD-10-CM

## 2020-08-05 LAB
T4 FREE SERPL-MCNC: 1.24 NG/DL (ref 0.76–1.46)
TSH SERPL DL<=0.05 MIU/L-ACNC: 1.23 UIU/ML (ref 0.36–3.74)

## 2020-08-05 PROCEDURE — 36415 COLL VENOUS BLD VENIPUNCTURE: CPT

## 2020-08-05 PROCEDURE — 84439 ASSAY OF FREE THYROXINE: CPT

## 2020-08-05 PROCEDURE — 84443 ASSAY THYROID STIM HORMONE: CPT

## 2020-08-06 ENCOUNTER — TELEPHONE (OUTPATIENT)
Dept: ENDOCRINOLOGY | Facility: CLINIC | Age: 35
End: 2020-08-06

## 2020-08-06 NOTE — TELEPHONE ENCOUNTER
----- Message from Wilmer Talamantes MD sent at 8/6/2020  9:40 AM EDT -----  The laboratory testing results are normal

## 2020-08-18 ENCOUNTER — OFFICE VISIT (OUTPATIENT)
Dept: URGENT CARE | Facility: MEDICAL CENTER | Age: 35
End: 2020-08-18
Payer: COMMERCIAL

## 2020-08-18 VITALS
OXYGEN SATURATION: 98 % | RESPIRATION RATE: 16 BRPM | SYSTOLIC BLOOD PRESSURE: 103 MMHG | DIASTOLIC BLOOD PRESSURE: 69 MMHG | HEART RATE: 75 BPM | TEMPERATURE: 99 F

## 2020-08-18 DIAGNOSIS — S80.212A ABRASION OF LEFT KNEE, INITIAL ENCOUNTER: Primary | ICD-10-CM

## 2020-08-18 PROCEDURE — 90715 TDAP VACCINE 7 YRS/> IM: CPT

## 2020-08-18 PROCEDURE — G0382 LEV 3 HOSP TYPE B ED VISIT: HCPCS | Performed by: FAMILY MEDICINE

## 2020-08-18 PROCEDURE — 90471 IMMUNIZATION ADMIN: CPT | Performed by: FAMILY MEDICINE

## 2020-08-18 RX ORDER — LIDOCAINE HYDROCHLORIDE 20 MG/ML
JELLY TOPICAL ONCE
Status: COMPLETED | OUTPATIENT
Start: 2020-08-18 | End: 2020-08-18

## 2020-08-18 RX ADMIN — LIDOCAINE HYDROCHLORIDE: 20 JELLY TOPICAL at 22:59

## 2020-08-19 NOTE — PROGRESS NOTES
[facility] [employer] [facility]        NAME: Socorro Acosta is a 36 y o  female  : 1985    MRN: [id number]  DATE: 2020  TIME: 10:55 PM    Assessment and Plan   Abrasion of left knee, initial encounter [S80 212A]  1  Abrasion of left knee, initial encounter  lidocaine (XYLOCAINE) 2 % topical gel    TDAP Vaccine greater than or equal to 6yo         Patient Instructions       Follow up with PCP in 3-5 days  Proceed to  ER if symptoms worsen  Chief Complaint     Chief Complaint   Patient presents with    Knee Injury     Patient relates she was going up steps outside [email or web address] at 9:00 pm and tripped and fell  Fell onto left knee  Denies head injury  History of Present Illness       31-year-old female here today because she sustained an abrasion of her left knee when she fell down her stairs this evening around 9:00 p m  [facility] She scraped her knee on concrete  She is able to bear weight  However complaining burning sensation where the abrasion is located  Patient cannot recall the last time she had a tetanus shot  Patient apply alcohol than wound which burn  Review of Systems   Review of Systems   Constitutional: Negative  Skin: Positive for rash and wound  Current Medications       Current Outpatient Medications:     levothyroxine 25 mcg tablet, Take 1 tablet (25 mcg total) by mouth daily, Disp: 90 tablet, Rfl: 3    tretinoin (RETIN-A) 0 025 % cream, Apply pea sized amount topically to face one hour before bedtime  , Disp: 45 g, Rfl: 6    drospirenone-ethinyl estradiol (BIANCA) 3-0 02 MG per tablet, Take 1 tablet by mouth daily (Patient not taking: Reported on 2020), Disp: 84 tablet, Rfl: 3    Current Facility-Administered Medications:     lidocaine (XYLOCAINE) 2 % topical gel, , Topical, Once, El Fuentes MD    Current Allergies     Allergies as of 2020 - Reviewed 2020   Allergen Reaction Noted    Acetaminophen Hives 2019    Ciprofloxacin Hives 2017 The following portions of the patient's history were reviewed and updated as appropriate: allergies, current medications, past family history, past medical history, past social history, past surgical history and problem list      History reviewed  No pertinent past medical history  Past Surgical History:   Procedure Laterality Date    THYROID SURGERY         Family History   Problem Relation Age of Onset    No Known Problems Mother     No Known Problems Father     No Known Problems Brother     No Known Problems Daughter     No Known Problems Son     No Known Problems Maternal Grandmother     No Known Problems Maternal Grandfather     No Known Problems Paternal Grandmother     No Known Problems Paternal Grandfather     No Known Problems Brother     No Known Problems Brother          Medications have been verified  Objective   /69   Pulse 75   Temp 99 °F (37 2 °C) (Tympanic)   Resp 16   LMP 07/28/2020   SpO2 98%        Physical Exam     Physical Exam  Vitals signs and nursing note reviewed  Musculoskeletal: Normal range of motion  Comments: Left knee:  Full range on flexion and extension  Lachman's test-negative  Dominic's test-negative  No pain elicited on valgus or varus stress  Gait-normal    Skin:     General: Skin is warm  Comments: Left anterior knee reveals superficial abrasion measuring approximately 3-4 cm infrapatellar area extending to the dermis  No active bleeding observed

## 2020-08-19 NOTE — PATIENT INSTRUCTIONS
After applying lidocaine gel waiting approximately 20 minutes, I was able to irrigate the wound and clean the wound  I apply bacitracin ointment followed by nonadherent dressing  Patient advised to change dressing daily apply antibiotic ointment and observe for any signs of wound infection next 2-3 days  Patient received tetanus booster  Recommended applying ice to rinse swelling Tylenol Motrin for pain  Abrasion   WHAT YOU NEED TO KNOW:   An abrasion is a scrape on your skin  It happens when your skin rubs against a rough surface  Some examples of an abrasion include rug burn, a skinned elbow, or road rash  Abrasions can be many shapes and sizes  The wound may hurt, bleed, bruise, or swell  DISCHARGE INSTRUCTIONS:   Return to the emergency department if:   · The bleeding does not stop after 10 minutes of firm pressure  · You cannot rinse one or more foreign objects out of your wound  · You have red streaks on your skin coming from your wound  Contact your healthcare provider if:   · You have a fever or chills  · Your abrasion is red, warm, swollen, or draining pus  · You have questions or concerns about your condition or care  Care for your abrasion:   · Wash your hands and dry them with a clean towel  · Press a clean cloth against your wound to stop any bleeding  · Rinse your wound with a lot of clean water  Do not use harsh soap, alcohol, or iodine solutions  · Use a clean, wet cloth to remove any objects, such as small pieces of rocks or dirt  · Rub antibiotic ointment on your wound  This may help prevent infection and help your wound heal     · Cover the wound with a non-stick bandage  Change the bandage daily, and if gets wet or dirty  Follow up with your healthcare provider as directed:  Write down your questions so you remember to ask them during your visits     © 2017 [employer] Information is for End User's use only and may not be sold, redistributed or otherwise used for commercial purposes  All illustrations and images included in CareNotes® are the copyrighted property of A D [employer]  or [employer]  The above information is an  only  It is not intended as medical advice for individual conditions or treatments  Talk to your doctor, nurse or pharmacist before following any medical regimen to see if it is safe and effective for you

## 2020-08-20 ENCOUNTER — OFFICE VISIT (OUTPATIENT)
Dept: ENDOCRINOLOGY | Facility: CLINIC | Age: 35
End: 2020-08-20
Payer: COMMERCIAL

## 2020-08-20 VITALS
BODY MASS INDEX: 20.01 KG/M2 | HEIGHT: 64 IN | WEIGHT: 117.2 LBS | DIASTOLIC BLOOD PRESSURE: 68 MMHG | HEART RATE: 88 BPM | SYSTOLIC BLOOD PRESSURE: 90 MMHG

## 2020-08-20 DIAGNOSIS — Z85.850 HX OF PAPILLARY THYROID CARCINOMA: Primary | ICD-10-CM

## 2020-08-20 DIAGNOSIS — L29.2 VULVAR ITCHING: ICD-10-CM

## 2020-08-20 DIAGNOSIS — E89.0 POSTOPERATIVE HYPOTHYROIDISM: ICD-10-CM

## 2020-08-20 PROCEDURE — 99213 OFFICE O/P EST LOW 20 MIN: CPT | Performed by: NURSE PRACTITIONER

## 2020-08-20 PROCEDURE — 3008F BODY MASS INDEX DOCD: CPT | Performed by: NURSE PRACTITIONER

## 2020-08-20 RX ORDER — LEVOTHYROXINE SODIUM 0.03 MG/1
25 TABLET ORAL DAILY
Qty: 90 TABLET | Refills: 3 | Status: SHIPPED | OUTPATIENT
Start: 2020-08-20 | End: 2021-11-30 | Stop reason: SDUPTHER

## 2020-08-20 NOTE — PROGRESS NOTES
Established Patient Progress Note       Chief Complaint   Patient presents with    Hypothyroidism     postoperative        History of Present Illness:     Addison Donis is a 36 y o  female with papillary thyroid CA and postoperative hypothyroidism  She had a left herbie thyroidectomy in March 2016 for a 1 2 cm papillary thyroid CA  She did not have a completion thyroidectomy or radioactive iodine therapy  Since then, she has had postoperative hypothyroidism for which she is on levothyroxine 25 mcg per day  She denies any symptoms of hypo or hyperthyroidism  She denies any family history of thyroid cancer or personal history of radiation exposure  Patient Active Problem List   Diagnosis    Encntr for gyn exam (general) (routine) w/o abn findings    Encounter for prescription of oral contraceptives    Vulvar irritation    Encounter for counseling regarding contraception    Non compliance w medication regimen    Vaginal discharge    Vulvar itching    Postoperative hypothyroidism    Hx of papillary thyroid carcinoma      History reviewed  No pertinent past medical history     Past Surgical History:   Procedure Laterality Date    THYROID SURGERY        Family History   Problem Relation Age of Onset    No Known Problems Mother     No Known Problems Father     No Known Problems Brother     No Known Problems Daughter     No Known Problems Son     No Known Problems Maternal Grandmother     No Known Problems Maternal Grandfather     No Known Problems Paternal Grandmother     No Known Problems Paternal Grandfather     No Known Problems Brother     No Known Problems Brother      Social History     Tobacco Use    Smoking status: Current Some Day Smoker    Smokeless tobacco: Current User    Tobacco comment: hooka daily in the summer, winter once per week   Substance Use Topics    Alcohol use: Not Currently     Allergies   Allergen Reactions    Acetaminophen Hives    Ciprofloxacin Hives To be further evaluated by allergy specialist per Dr Deedee Ragland       Current Outpatient Medications:     levothyroxine 25 mcg tablet, Take 1 tablet (25 mcg total) by mouth daily, Disp: 90 tablet, Rfl: 3    tretinoin (RETIN-A) 0 025 % cream, Apply pea sized amount topically to face one hour before bedtime  , Disp: 45 g, Rfl: 6    Review of Systems   Constitutional: Negative for activity change, appetite change, fatigue and unexpected weight change  HENT: Negative for trouble swallowing and voice change  Eyes: Negative for visual disturbance  Respiratory: Negative for chest tightness and shortness of breath  Cardiovascular: Negative for chest pain, palpitations and leg swelling  Gastrointestinal: Negative for constipation, diarrhea, nausea and vomiting  Endocrine: Negative for cold intolerance and heat intolerance  Genitourinary: Negative for menstrual problem  Skin: Negative for wound  Neurological: Negative for dizziness, weakness, light-headedness, numbness and headaches  Psychiatric/Behavioral: Negative for agitation, dysphoric mood and sleep disturbance  The patient is not nervous/anxious  Physical Exam:  Body mass index is 20 12 kg/m²  BP 90/68   Pulse 88   Ht 5' 4" (1 626 m)   Wt 53 2 kg (117 lb 3 2 oz)   LMP 07/28/2020   BMI 20 12 kg/m²    Wt Readings from Last 3 Encounters:   08/20/20 53 2 kg (117 lb 3 2 oz)   07/29/20 53 3 kg (117 lb 6 4 oz)   04/27/20 51 7 kg (114 lb)       Physical Exam  Constitutional:       Appearance: Normal appearance  She is well-developed and normal weight  HENT:      Head: Normocephalic and atraumatic  Comments: Mask in place  Eyes:      Pupils: Pupils are equal, round, and reactive to light  Neck:      Musculoskeletal: Normal range of motion and neck supple  Thyroid: No thyromegaly  Cardiovascular:      Rate and Rhythm: Normal rate and regular rhythm     Pulmonary:      Effort: Pulmonary effort is normal       Breath sounds: Normal breath sounds  Abdominal:      General: Bowel sounds are normal       Palpations: Abdomen is soft  Musculoskeletal:      Right lower leg: No edema  Left lower leg: No edema  Lymphadenopathy:      Cervical: No cervical adenopathy  Skin:     General: Skin is warm and dry  Capillary Refill: Capillary refill takes less than 2 seconds  Neurological:      Mental Status: She is alert and oriented to person, place, and time  Psychiatric:         Mood and Affect: Mood normal          Behavior: Behavior normal          Labs:       No results found for: CREATININE, BUN, NA, K, CL, CO2  No results found for: EGFR    Lab Results   Component Value Date    HDL 72 (H) 09/10/2019    TRIG 66 09/10/2019       No results found for: ALT, AST, GGT, ALKPHOS, BILITOT    Lab Results   Component Value Date    FREET4 1 24 08/05/2020         Impression & Plan:    Problem List Items Addressed This Visit        Endocrine    Postoperative hypothyroidism     TSH at goal of low-normal  Patient also states that she does miss doses  Encouraged to take all doses 1 hour prior to meals  She feels well  Continue current regimen  Repeat thyroid panel in 6 months or if symptoms arise  Relevant Medications    levothyroxine 25 mcg tablet       Musculoskeletal and Integument    Vulvar itching       Other    Hx of papillary thyroid carcinoma - Primary     US of 7/2020 shoes no evidence of recurrence  Will continue to monitor with US yearly  No thyroglobulin indicated as patient has R lobe of thyroid  Relevant Orders    US thyroid          Orders Placed This Encounter   Procedures    US thyroid     Standing Status:   Future     Standing Expiration Date:   8/20/2024     Scheduling Instructions:      No prep required  Please bring your insurance cards, a form of photo ID and a list of your medications with you  Arrive 15 minutes prior to your appointment time in order to register              To schedule this appointment, please contact Central Scheduling at [phone]  Discussed with the patient and all questioned fully answered  She will call me if any problems arise  Follow-up appointment in 12 months       Counseled patient on diagnostic results, prognosis, risk and benefit of treatment options, instruction for management, importance of treatment compliance, Risk  factor reduction and impressions      CHASIDY Mcadams

## 2020-08-20 NOTE — ASSESSMENT & PLAN NOTE
US of 7/2020 shoes no evidence of recurrence  Will continue to monitor with US yearly  No thyroglobulin indicated as patient has R lobe of thyroid

## 2020-08-20 NOTE — ASSESSMENT & PLAN NOTE
TSH at goal of low-normal  Patient also states that she does miss doses  Encouraged to take all doses 1 hour prior to meals  She feels well  Continue current regimen  Repeat thyroid panel in 6 months or if symptoms arise

## 2020-11-11 ENCOUNTER — TELEMEDICINE (OUTPATIENT)
Dept: FAMILY MEDICINE CLINIC | Facility: CLINIC | Age: 35
End: 2020-11-11
Payer: COMMERCIAL

## 2020-11-11 DIAGNOSIS — B34.9 VIRAL INFECTION: ICD-10-CM

## 2020-11-11 DIAGNOSIS — Z20.822 COVID-19 RULED OUT: ICD-10-CM

## 2020-11-11 DIAGNOSIS — B34.9 VIRAL INFECTION: Primary | ICD-10-CM

## 2020-11-11 PROCEDURE — U0003 INFECTIOUS AGENT DETECTION BY NUCLEIC ACID (DNA OR RNA); SEVERE ACUTE RESPIRATORY SYNDROME CORONAVIRUS 2 (SARS-COV-2) (CORONAVIRUS DISEASE [COVID-19]), AMPLIFIED PROBE TECHNIQUE, MAKING USE OF HIGH THROUGHPUT TECHNOLOGIES AS DESCRIBED BY CMS-2020-01-R: HCPCS | Performed by: FAMILY MEDICINE

## 2020-11-11 PROCEDURE — 99213 OFFICE O/P EST LOW 20 MIN: CPT | Performed by: FAMILY MEDICINE

## 2020-11-13 LAB — SARS-COV-2 RNA SPEC QL NAA+PROBE: NOT DETECTED

## 2020-12-21 ENCOUNTER — TELEPHONE (OUTPATIENT)
Dept: FAMILY MEDICINE CLINIC | Facility: CLINIC | Age: 35
End: 2020-12-21

## 2020-12-21 DIAGNOSIS — Z20.822 EXPOSURE TO COVID-19 VIRUS: ICD-10-CM

## 2020-12-21 DIAGNOSIS — B34.9 VIRAL INFECTION, UNSPECIFIED: ICD-10-CM

## 2020-12-21 PROCEDURE — U0003 INFECTIOUS AGENT DETECTION BY NUCLEIC ACID (DNA OR RNA); SEVERE ACUTE RESPIRATORY SYNDROME CORONAVIRUS 2 (SARS-COV-2) (CORONAVIRUS DISEASE [COVID-19]), AMPLIFIED PROBE TECHNIQUE, MAKING USE OF HIGH THROUGHPUT TECHNOLOGIES AS DESCRIBED BY CMS-2020-01-R: HCPCS | Performed by: FAMILY MEDICINE

## 2020-12-22 LAB — SARS-COV-2 RNA SPEC QL NAA+PROBE: NOT DETECTED

## 2021-01-22 ENCOUNTER — TELEPHONE (OUTPATIENT)
Dept: DERMATOLOGY | Facility: CLINIC | Age: 36
End: 2021-01-22

## 2021-01-22 NOTE — TELEPHONE ENCOUNTER
Patient called stating she tried to refill the Tretinoin 0 025% cream but it needs a prior Adi Rolle

## 2021-01-24 NOTE — PROGRESS NOTES
Assessment/Plan:  NGE  BCM- Bianca -  Options discussed,  Prefers to restart OCs  Withdrawal first cycle  Vaginal discharge-  Monilia, Diflucan  Co Testing q 5 yrs  '24  RTO 1yr  SBA monthly  Exercise 3/wk  Calcium 1,000 mg/d with Vit D     Depression Screen: Neg       Diagnoses and all orders for this visit:    Encounter for annual routine gynecological examination  -     drospirenone-ethinyl estradiol (BIANCA) 3-0 02 MG per tablet; Take 1 tablet by mouth daily    Encounter for prescription of oral contraceptives  -     drospirenone-ethinyl estradiol (BIANCA) 3-0 02 MG per tablet; Take 1 tablet by mouth daily    Moniliasis  -     POCT wet mount  -     fluconazole (DIFLUCAN) 150 mg tablet; Take 1 tablet (150 mg total) by mouth once for 1 dose    Vaginal discharge  -     POCT wet mount              Subjective:        Patient ID: Nash Mayorga is a 30 y o  female  Ne Peel presents for a yearly evaluation  She would like to talk about contraceptive options  She believes she has a another yeast infection which occurs approximately once a year  She has been using the statin cream but the symptoms persist   A vaginal discharge is present  Earlier there was vaginal irritation  She has not used oral contraceptives in approximately a year  She had a virtual visit and discussed options at this past  April  She was considering Depo-Provera but then changed her mind  Her  suggested she have a tubal ligation  They currently are using withdrawal   Last year she was on Bianca and had issues with compliance where she would miss 3-4 pills a month  She was working then as a   Now she is at home and believes that remembering to take the pill would be much easier  After brief discussion  of tubal ligation,  Vasectomy, Depo-Provera and  Oral contraceptive she believes she would like to restart the pill        The following portions of the patient's history were reviewed and updated as appropriate: She has no past medical history on file  Patient Active Problem List    Diagnosis Date Noted    Encounter for counseling regarding contraception 04/27/2020    Non compliance w medication regimen 04/27/2020    Vaginal discharge 04/27/2020    Vulvar itching 04/27/2020    Vulvar irritation 12/09/2019    Encntr for gyn exam (general) (routine) w/o abn findings 04/24/2019    Encounter for prescription of oral contraceptives 04/24/2019    Postoperative hypothyroidism 01/29/2018    Hx of papillary thyroid carcinoma 03/22/2016   PMH:  G2, P2; SAVD x 2, '06 F, '08 M  Partial Thyroidectomy- Papillary Thyroid Ca '16  Post Op Hypothyroidism  Acne '19  Vulvar irritation 12/19, 4/20, 1/21  She  has a past surgical history that includes Thyroid surgery  Her family history includes No Known Problems in her brother, brother, brother, daughter, father, maternal grandfather, maternal grandmother, mother, paternal grandfather, paternal grandmother, and son  She  reports that she has been smoking  She uses smokeless tobacco  She reports previous alcohol use  She reports that she does not use drugs  SH:   to [address] [place] [address] [employer] employee who is 43  Current Outpatient Medications   Medication Sig Dispense Refill    drospirenone-ethinyl estradiol (BIANCA) 3-0 02 MG per tablet Take 1 tablet by mouth daily 84 tablet 3    fluconazole (DIFLUCAN) 150 mg tablet Take 1 tablet (150 mg total) by mouth once for 1 dose 1 tablet 0    levothyroxine 25 mcg tablet Take 1 tablet (25 mcg total) by mouth daily 90 tablet 3    tretinoin (RETIN-A) 0 025 % cream Apply pea sized amount topically to face one hour before bedtime  45 g 6     No current facility-administered medications for this visit  Current Outpatient Medications on File Prior to Visit   Medication Sig    levothyroxine 25 mcg tablet Take 1 tablet (25 mcg total) by mouth daily    tretinoin (RETIN-A) 0 025 % cream Apply pea sized amount topically to face one hour before bedtime  No current facility-administered medications on file prior to visit  She is allergic to acetaminophen and ciprofloxacin       Review of Systems   Constitutional: Negative for activity change, appetite change, chills, fatigue, fever and unexpected weight change  HENT: Negative for congestion, rhinorrhea, sinus pressure, sore throat and trouble swallowing  Eyes: Negative for discharge, redness, itching and visual disturbance  Respiratory: Negative for cough, chest tightness, shortness of breath and wheezing  Cardiovascular: Negative for chest pain, palpitations and leg swelling  Gastrointestinal: Negative for abdominal distention, abdominal pain, blood in stool, constipation, diarrhea, nausea and vomiting  Genitourinary: Positive for vaginal discharge  Negative for decreased urine volume, difficulty urinating, dyspareunia, dysuria, frequency, hematuria, menstrual problem, pelvic pain, urgency, vaginal bleeding and vaginal pain  Musculoskeletal: Negative for arthralgias  Skin: Negative for rash  Neurological: Positive for headaches ( migraines around menses)  Negative for weakness, light-headedness and numbness  Hematological: Does not bruise/bleed easily  Psychiatric/Behavioral: Negative for agitation, behavioral problems and sleep disturbance  The patient is not nervous/anxious  Objective:    Vitals:    01/25/21 1353   BP: 108/60   BP Location: Left arm   Patient Position: Sitting   Cuff Size: Standard   Weight: 53 3 kg (117 lb 9 6 oz)   Height: 5' 4" (1 626 m)            Physical Exam  Vitals signs and nursing note reviewed  Exam conducted with a chaperone present  Constitutional:       Appearance: She is well-developed  HENT:      Head: Normocephalic and atraumatic  Eyes:      Extraocular Movements: Extraocular movements intact  Conjunctiva/sclera: Conjunctivae normal    Neck:      Musculoskeletal: Normal range of motion and neck supple        Thyroid: No thyromegaly  Trachea: No tracheal deviation  Cardiovascular:      Rate and Rhythm: Normal rate and regular rhythm  Heart sounds: Normal heart sounds  No murmur  Pulmonary:      Effort: Pulmonary effort is normal  No respiratory distress  Breath sounds: Normal breath sounds  No wheezing  Chest:      Breasts: Breasts are symmetrical          Right: No inverted nipple, mass, nipple discharge, skin change or tenderness  Left: No inverted nipple, mass, nipple discharge, skin change or tenderness  Abdominal:      General: Bowel sounds are normal  There is no distension  Palpations: Abdomen is soft  There is no mass  Tenderness: There is no abdominal tenderness  Genitourinary:     General: Normal vulva  Exam position: Supine  Labia:         Right: No rash, tenderness or lesion  Left: No rash, tenderness or lesion  Vagina: No foreign body  Vaginal discharge present  No erythema, tenderness or bleeding  Cervix: No cervical motion tenderness or discharge  Uterus: Not deviated, not enlarged and not tender  Adnexa:         Right: No mass, tenderness or fullness  Left: No mass, tenderness or fullness  Rectum: No external hemorrhoid  Comments: Urethral meatus within normal limits  Perineum within normal limits  Bladder well supported  Thick white homogeneous discharge with a pH of 4 5  Musculoskeletal: Normal range of motion  Skin:     General: Skin is warm and dry  Neurological:      Mental Status: She is alert and oriented to person, place, and time  Psychiatric:         Behavior: Behavior normal          Thought Content:  Thought content normal          Judgment: Judgment normal

## 2021-01-25 ENCOUNTER — ANNUAL EXAM (OUTPATIENT)
Dept: GYNECOLOGY | Facility: CLINIC | Age: 36
End: 2021-01-25
Payer: COMMERCIAL

## 2021-01-25 VITALS
WEIGHT: 117.6 LBS | BODY MASS INDEX: 20.08 KG/M2 | DIASTOLIC BLOOD PRESSURE: 60 MMHG | HEIGHT: 64 IN | SYSTOLIC BLOOD PRESSURE: 108 MMHG

## 2021-01-25 DIAGNOSIS — Z01.419 ENCOUNTER FOR ANNUAL ROUTINE GYNECOLOGICAL EXAMINATION: Primary | ICD-10-CM

## 2021-01-25 DIAGNOSIS — Z30.011 ENCOUNTER FOR PRESCRIPTION OF ORAL CONTRACEPTIVES: ICD-10-CM

## 2021-01-25 DIAGNOSIS — B37.9 MONILIASIS: ICD-10-CM

## 2021-01-25 DIAGNOSIS — N89.8 VAGINAL DISCHARGE: ICD-10-CM

## 2021-01-25 LAB
BV WHIFF TEST VAG QL: NEGATIVE
CLUE CELLS SPEC QL WET PREP: NEGATIVE
PH SMN: 4.5 [PH]
SL AMB POCT WET MOUNT: ABNORMAL
T VAGINALIS VAG QL WET PREP: NEGATIVE
YEAST VAG QL WET PREP: POSITIVE

## 2021-01-25 PROCEDURE — 99395 PREV VISIT EST AGE 18-39: CPT | Performed by: OBSTETRICS & GYNECOLOGY

## 2021-01-25 PROCEDURE — 87210 SMEAR WET MOUNT SALINE/INK: CPT | Performed by: OBSTETRICS & GYNECOLOGY

## 2021-01-25 RX ORDER — FLUCONAZOLE 150 MG/1
150 TABLET ORAL ONCE
Qty: 1 TABLET | Refills: 0 | Status: SHIPPED | OUTPATIENT
Start: 2021-01-25 | End: 2021-01-25

## 2021-01-25 RX ORDER — DROSPIRENONE AND ETHINYL ESTRADIOL 0.02-3(28)
1 KIT ORAL DAILY
Qty: 84 TABLET | Refills: 3 | Status: SHIPPED | OUTPATIENT
Start: 2021-01-25 | End: 2021-12-06 | Stop reason: SDUPTHER

## 2021-07-20 ENCOUNTER — TELEPHONE (OUTPATIENT)
Dept: OBGYN CLINIC | Facility: CLINIC | Age: 36
End: 2021-07-20

## 2021-07-20 NOTE — TELEPHONE ENCOUNTER
Patient called and stated she never received her [facility], but the pharmacy said is was delivered and now she is out  She has missed two pills and would like to know when she should restart her pills  Could someone please advise  Call back and pharmacy correct

## 2021-07-20 NOTE — TELEPHONE ENCOUNTER
Patient states she just picked up another pack of pills  Advised to take 2 today and 2 tomorrow  Use back up protection  May expect breakthrough bleeding  Patient verbalizes understanding

## 2021-08-27 ENCOUNTER — HOSPITAL ENCOUNTER (OUTPATIENT)
Dept: ULTRASOUND IMAGING | Facility: HOSPITAL | Age: 36
Discharge: HOME | End: 2021-08-27
Payer: COMMERCIAL

## 2021-08-27 DIAGNOSIS — Z85.850 HX OF PAPILLARY THYROID CARCINOMA: ICD-10-CM

## 2021-08-27 PROCEDURE — 76536 US EXAM OF HEAD AND NECK: CPT

## 2021-08-30 ENCOUNTER — OFFICE VISIT (OUTPATIENT)
Dept: ENDOCRINOLOGY | Facility: CLINIC | Age: 36
End: 2021-08-30
Payer: COMMERCIAL

## 2021-08-30 ENCOUNTER — LAB (OUTPATIENT)
Dept: LAB | Facility: CLINIC | Age: 36
End: 2021-08-30
Payer: COMMERCIAL

## 2021-08-30 VITALS
SYSTOLIC BLOOD PRESSURE: 100 MMHG | WEIGHT: 112.8 LBS | HEART RATE: 68 BPM | DIASTOLIC BLOOD PRESSURE: 68 MMHG | HEIGHT: 64 IN | BODY MASS INDEX: 19.26 KG/M2

## 2021-08-30 DIAGNOSIS — E89.0 POSTOPERATIVE HYPOTHYROIDISM: ICD-10-CM

## 2021-08-30 DIAGNOSIS — E89.0 POSTOPERATIVE HYPOTHYROIDISM: Primary | ICD-10-CM

## 2021-08-30 DIAGNOSIS — Z85.850 HX OF PAPILLARY THYROID CARCINOMA: ICD-10-CM

## 2021-08-30 LAB
T4 FREE SERPL-MCNC: 1.18 NG/DL (ref 0.76–1.46)
TSH SERPL DL<=0.05 MIU/L-ACNC: 1.04 UIU/ML (ref 0.36–3.74)

## 2021-08-30 PROCEDURE — 99214 OFFICE O/P EST MOD 30 MIN: CPT | Performed by: INTERNAL MEDICINE

## 2021-08-30 PROCEDURE — 84443 ASSAY THYROID STIM HORMONE: CPT

## 2021-08-30 PROCEDURE — 36415 COLL VENOUS BLD VENIPUNCTURE: CPT

## 2021-08-30 PROCEDURE — 84439 ASSAY OF FREE THYROXINE: CPT

## 2021-08-30 NOTE — PROGRESS NOTES
Shane Casey 30 y o  female MRN: [id number]    Encounter: [id number]      Assessment/Plan     Assessment: This is a 30y o -year-old female with  Thyroid cancer and postoperative hypothyroidism  Plan:  1  Thyroid cancer- await ultrasound of the thyroid  I ordered a repeat ultrasound to be done in the year  2   Postoperative hypothyroidism - continue levothyroxine  Check TSH and free T4 now  Based on results, I will plan to make changes to her regimen if necessary  If her TSH is greater than one, I will increase her levothyroxine to 37 mcg per day  CC:     Papillary thyroid cancer    History of Present Illness      HPI:   80-year-old woman with history of 1 2 cm left thyroid papillary thyroid cancer presents for follow-up  She had a left thyroidectomy  She did not undergo completion thyroidectomy  She has been having yearly ultrasound which do not show evidence of recurrence  For postoperative hypothyroidism, she is on levothyroxine  She denies any symptoms of hypo or hyperthyroidism  Review of Systems   Constitutional: Negative for chills and fever  Respiratory: Negative for shortness of breath  Cardiovascular: Negative for chest pain  Gastrointestinal: Negative for constipation, diarrhea, nausea and vomiting  Endocrine: Positive for cold intolerance  Negative for heat intolerance  All other systems reviewed and are negative  Historical Information   No past medical history on file    Past Surgical History:   Procedure Laterality Date    THYROID SURGERY       Social History   Social History     Substance and Sexual Activity   Alcohol Use Not Currently     Social History     Substance and Sexual Activity   Drug Use Never     Social History     Tobacco Use   Smoking Status Current Some Day Smoker   Smokeless Tobacco Current User   Tobacco Comment    hooka daily in the summer, winter once per week     Family History:   Family History   Problem Relation Age of Onset    No Known Problems Mother     No Known Problems Father     No Known Problems Brother     No Known Problems Daughter     No Known Problems Son     No Known Problems Maternal Grandmother     No Known Problems Maternal Grandfather     No Known Problems Paternal Grandmother     No Known Problems Paternal Grandfather     No Known Problems Brother     No Known Problems Brother        Meds/Allergies   Current Outpatient Medications   Medication Sig Dispense Refill    drospirenone-ethinyl estradiol (BIANCA) 3-0 02 MG per tablet Take 1 tablet by mouth daily 84 tablet 3    levothyroxine 25 mcg tablet Take 1 tablet (25 mcg total) by mouth daily 90 tablet 3    tretinoin (RETIN-A) 0 025 % cream Apply pea sized amount topically to face one hour before bedtime  45 g 6     No current facility-administered medications for this visit  Allergies   Allergen Reactions    Acetaminophen Hives    Ciprofloxacin Hives     To be further evaluated by allergy specialist per Dr Bereket Gonzalez       Objective   Vitals: Blood pressure 100/68, pulse 68, height 5' 4" (1 626 m), weight 51 2 kg (112 lb 12 8 oz)  Physical Exam  Vitals reviewed  Constitutional:       General: She is not in acute distress  Appearance: She is well-developed  She is not diaphoretic  HENT:      Head: Normocephalic and atraumatic  Eyes:      General: Lids are normal  No scleral icterus  Right eye: No discharge  Left eye: No discharge  Conjunctiva/sclera: Conjunctivae normal    Neck:      Thyroid: No thyromegaly  Cardiovascular:      Rate and Rhythm: Normal rate and regular rhythm  Heart sounds: Normal heart sounds  No murmur heard  No friction rub  No gallop  Pulmonary:      Effort: Pulmonary effort is normal  No respiratory distress  Breath sounds: Normal breath sounds  No wheezing  Abdominal:      General: Bowel sounds are normal  There is no distension  Palpations: Abdomen is soft  Tenderness:  There is no abdominal tenderness  Musculoskeletal:         General: No tenderness or deformity  Normal range of motion  Cervical back: Neck supple  Lymphadenopathy:      Head:      Right side of head: No occipital adenopathy  Left side of head: No occipital adenopathy  Upper Body:      Right upper body: No supraclavicular adenopathy  Left upper body: No supraclavicular adenopathy  Skin:     General: Skin is warm  Findings: No erythema or rash  Neurological:      Mental Status: She is alert and oriented to person, place, and time  Cranial Nerves: No cranial nerve deficit  Psychiatric:         Mood and Affect: Mood normal          Behavior: Behavior normal          The history was obtained from the review of the chart, patient  Lab Results:      Imaging Studies:  Results for orders placed during the hospital encounter of 07/23/20    US head neck lymph node mapping    Impression  No evidence of recurrent or metastatic disease  Workstation performed: [id number]         I have personally reviewed pertinent reports  Portions of the record may have been created with voice recognition software  Occasional wrong word or "sound a like" substitutions may have occurred due to the inherent limitations of voice recognition software  Read the chart carefully and recognize, using context, where substitutions have occurred

## 2021-08-31 ENCOUNTER — TELEPHONE (OUTPATIENT)
Dept: ENDOCRINOLOGY | Facility: CLINIC | Age: 36
End: 2021-08-31

## 2021-08-31 NOTE — TELEPHONE ENCOUNTER
----- Message from Iam Pena MD sent at 8/31/2021 12:03 PM EDT -----  The laboratory testing results are normal

## 2021-09-03 ENCOUNTER — OFFICE VISIT (OUTPATIENT)
Dept: URGENT CARE | Facility: MEDICAL CENTER | Age: 36
End: 2021-09-03
Payer: COMMERCIAL

## 2021-09-03 VITALS
TEMPERATURE: 99.1 F | HEIGHT: 64 IN | RESPIRATION RATE: 20 BRPM | HEART RATE: 82 BPM | SYSTOLIC BLOOD PRESSURE: 99 MMHG | WEIGHT: 114.64 LBS | DIASTOLIC BLOOD PRESSURE: 56 MMHG | BODY MASS INDEX: 19.57 KG/M2 | OXYGEN SATURATION: 97 %

## 2021-09-03 DIAGNOSIS — R42 DIZZY SPELLS: Primary | ICD-10-CM

## 2021-09-03 DIAGNOSIS — H69.83 EUSTACHIAN TUBE DYSFUNCTION, BILATERAL: ICD-10-CM

## 2021-09-03 LAB
GLUCOSE SERPL-MCNC: 96 MG/DL (ref 65–140)
SL AMB POCT GLUCOSE BLD: 96

## 2021-09-03 PROCEDURE — G0382 LEV 3 HOSP TYPE B ED VISIT: HCPCS | Performed by: PHYSICIAN ASSISTANT

## 2021-09-03 PROCEDURE — 82948 REAGENT STRIP/BLOOD GLUCOSE: CPT | Performed by: PHYSICIAN ASSISTANT

## 2021-09-03 RX ORDER — FLUTICASONE PROPIONATE 50 MCG
2 SPRAY, SUSPENSION (ML) NASAL DAILY
Qty: 16 G | Refills: 0 | Status: SHIPPED | OUTPATIENT
Start: 2021-09-03

## 2021-09-04 NOTE — PROGRESS NOTES
[address] [facility] [employer] [facility]        NAME: Robert Dawson is a 30 y o  female  : 1985    MRN: [id number]  DATE: September 3, 2021  TIME: 8:54 PM    Assessment and Plan   Dizzy spells [R42]  1  Dizzy spells  POCT blood glucose   2  Eustachian tube dysfunction, bilateral  fluticasone (FLONASE) 50 mcg/act nasal spray         Patient Instructions      utilize Claritin Allegra or Zyrtec daily  Follow up with PCP in 3-5 days  Proceed to  ER if symptoms worsen  Chief Complaint     Chief Complaint   Patient presents with    Dizziness     states started to feel dizzy and nauseous at 2pm today         History of Present Illness        Patient complains of congestion, postnasal drip, seasonal allergies and today felt dizzy and slightly lightheaded  She states that she more junk today than usual and is concerned about her blood sugar also  Dizziness  This is a new problem  The current episode started today  The problem occurs intermittently  The problem has been gradually improving  Associated symptoms include congestion and fatigue  Pertinent negatives include no abdominal pain, anorexia, arthralgias, change in bowel habit, chest pain, chills, coughing, diaphoresis, fever, headaches, joint swelling, myalgias, nausea, neck pain, numbness, rash, sore throat, swollen glands, urinary symptoms, vertigo, visual change, vomiting or weakness  Nothing aggravates the symptoms  She has tried nothing for the symptoms  Review of Systems   Review of Systems   Constitutional: Positive for fatigue  Negative for chills, diaphoresis and fever  HENT: Positive for congestion  Negative for sore throat  Respiratory: Negative for cough  Cardiovascular: Negative for chest pain  Gastrointestinal: Negative for abdominal pain, anorexia, change in bowel habit, nausea and vomiting  Musculoskeletal: Negative for arthralgias, joint swelling, myalgias and neck pain  Skin: Negative for rash     Neurological: Positive for dizziness  Negative for vertigo, weakness, numbness and headaches  All other systems reviewed and are negative  Current Medications       Current Outpatient Medications:     drospirenone-ethinyl estradiol (BIANCA) 3-0 02 MG per tablet, Take 1 tablet by mouth daily, Disp: 84 tablet, Rfl: 3    fluticasone (FLONASE) 50 mcg/act nasal spray, 2 sprays into each nostril daily, Disp: 16 g, Rfl: 0    levothyroxine 25 mcg tablet, Take 1 tablet (25 mcg total) by mouth daily, Disp: 90 tablet, Rfl: 3    tretinoin (RETIN-A) 0 025 % cream, Apply pea sized amount topically to face one hour before bedtime  , Disp: 45 g, Rfl: 6    Current Allergies     Allergies as of 09/03/2021 - Reviewed 09/03/2021   Allergen Reaction Noted    Acetaminophen Hives 04/24/2019    Ciprofloxacin Hives 09/14/2017            The following portions of the patient's history were reviewed and updated as appropriate: allergies, current medications, past family history, past medical history, past social history, past surgical history and problem list      No past medical history on file  Past Surgical History:   Procedure Laterality Date    THYROID SURGERY         Family History   Problem Relation Age of Onset    No Known Problems Mother     No Known Problems Father     No Known Problems Brother     No Known Problems Daughter     No Known Problems Son     No Known Problems Maternal Grandmother     No Known Problems Maternal Grandfather     No Known Problems Paternal Grandmother     No Known Problems Paternal Grandfather     No Known Problems Brother     No Known Problems Brother          Medications have been verified  Objective   BP 99/56   Pulse 82   Temp 99 1 °F (37 3 °C)   Resp 20   Ht 5' 4" (1 626 m)   Wt 52 kg (114 lb 10 2 oz)   SpO2 97%   BMI 19 68 kg/m²   No LMP recorded  Physical Exam     Physical Exam  Vitals and nursing note reviewed  Constitutional:       Appearance: Normal appearance   She is normal weight  HENT:      Nose: Congestion and rhinorrhea present  Cardiovascular:      Rate and Rhythm: Normal rate and regular rhythm  Pulses: Normal pulses  Heart sounds: Normal heart sounds  Pulmonary:      Effort: Pulmonary effort is normal       Breath sounds: Normal breath sounds  Musculoskeletal:      Cervical back: Normal range of motion  Neurological:      Mental Status: She is alert  TM slightly bulging bilaterally  good transillumination  Maxillary sinuses  Blood sugar was 96

## 2021-09-11 ENCOUNTER — APPOINTMENT (OUTPATIENT)
Dept: LAB | Age: 36
End: 2021-09-11

## 2021-09-11 DIAGNOSIS — Z00.8 HEALTH EXAMINATION IN POPULATION SURVEY: ICD-10-CM

## 2021-09-11 LAB
CHOLEST SERPL-MCNC: 214 MG/DL (ref 50–200)
EST. AVERAGE GLUCOSE BLD GHB EST-MCNC: 100 MG/DL
HBA1C MFR BLD: 5.1 %
HDLC SERPL-MCNC: 69 MG/DL
LDLC SERPL CALC-MCNC: 128 MG/DL (ref 0–100)
NONHDLC SERPL-MCNC: 145 MG/DL
TRIGL SERPL-MCNC: 85 MG/DL

## 2021-09-11 PROCEDURE — 83036 HEMOGLOBIN GLYCOSYLATED A1C: CPT

## 2021-09-11 PROCEDURE — 36415 COLL VENOUS BLD VENIPUNCTURE: CPT

## 2021-09-11 PROCEDURE — 80061 LIPID PANEL: CPT

## 2021-11-22 ENCOUNTER — NURSE TRIAGE (OUTPATIENT)
Dept: OTHER | Facility: OTHER | Age: 36
End: 2021-11-22

## 2021-11-22 DIAGNOSIS — Z20.828 EXPOSURE TO SARS-ASSOCIATED CORONAVIRUS: Primary | ICD-10-CM

## 2021-11-22 PROCEDURE — U0005 INFEC AGEN DETEC AMPLI PROBE: HCPCS | Performed by: FAMILY MEDICINE

## 2021-11-22 PROCEDURE — U0003 INFECTIOUS AGENT DETECTION BY NUCLEIC ACID (DNA OR RNA); SEVERE ACUTE RESPIRATORY SYNDROME CORONAVIRUS 2 (SARS-COV-2) (CORONAVIRUS DISEASE [COVID-19]), AMPLIFIED PROBE TECHNIQUE, MAKING USE OF HIGH THROUGHPUT TECHNOLOGIES AS DESCRIBED BY CMS-2020-01-R: HCPCS | Performed by: FAMILY MEDICINE

## 2021-11-24 ENCOUNTER — TELEMEDICINE (OUTPATIENT)
Dept: FAMILY MEDICINE CLINIC | Facility: CLINIC | Age: 36
End: 2021-11-24
Payer: COMMERCIAL

## 2021-11-24 DIAGNOSIS — U07.1 COVID-19 VIRUS INFECTION: Primary | ICD-10-CM

## 2021-11-24 PROCEDURE — 99213 OFFICE O/P EST LOW 20 MIN: CPT | Performed by: FAMILY MEDICINE

## 2021-11-30 DIAGNOSIS — E89.0 POSTOPERATIVE HYPOTHYROIDISM: ICD-10-CM

## 2021-11-30 RX ORDER — LEVOTHYROXINE SODIUM 0.03 MG/1
25 TABLET ORAL DAILY
Qty: 90 TABLET | Refills: 3 | Status: SHIPPED | OUTPATIENT
Start: 2021-11-30

## 2021-12-06 DIAGNOSIS — Z30.011 ENCOUNTER FOR PRESCRIPTION OF ORAL CONTRACEPTIVES: ICD-10-CM

## 2021-12-06 DIAGNOSIS — Z01.419 ENCOUNTER FOR ANNUAL ROUTINE GYNECOLOGICAL EXAMINATION: ICD-10-CM

## 2021-12-06 RX ORDER — DROSPIRENONE AND ETHINYL ESTRADIOL 0.02-3(28)
1 KIT ORAL DAILY
Qty: 84 TABLET | Refills: 0 | Status: SHIPPED | OUTPATIENT
Start: 2021-12-06 | End: 2022-03-22 | Stop reason: SDUPTHER

## 2022-03-11 DIAGNOSIS — Z30.011 ENCOUNTER FOR PRESCRIPTION OF ORAL CONTRACEPTIVES: ICD-10-CM

## 2022-03-11 DIAGNOSIS — Z01.419 ENCOUNTER FOR ANNUAL ROUTINE GYNECOLOGICAL EXAMINATION: ICD-10-CM

## 2022-03-22 ENCOUNTER — TELEPHONE (OUTPATIENT)
Dept: OBGYN CLINIC | Facility: CLINIC | Age: 37
End: 2022-03-22

## 2022-03-22 DIAGNOSIS — Z30.011 ENCOUNTER FOR PRESCRIPTION OF ORAL CONTRACEPTIVES: ICD-10-CM

## 2022-03-22 DIAGNOSIS — Z01.419 ENCOUNTER FOR ANNUAL ROUTINE GYNECOLOGICAL EXAMINATION: ICD-10-CM

## 2022-03-22 RX ORDER — DROSPIRENONE AND ETHINYL ESTRADIOL 0.02-3(28)
1 KIT ORAL DAILY
Qty: 28 TABLET | Refills: 0 | Status: SHIPPED | OUTPATIENT
Start: 2022-03-22 | End: 2022-04-04 | Stop reason: SDUPTHER

## 2022-03-22 NOTE — TELEPHONE ENCOUNTER
Pt needs refill on Toshia to [facility] in [facility] (not mail order)- pt made yearly for 4/4 with  Dr Kennedy Coad

## 2022-03-28 ENCOUNTER — TELEPHONE (OUTPATIENT)
Dept: DERMATOLOGY | Facility: CLINIC | Age: 37
End: 2022-03-28

## 2022-03-28 NOTE — TELEPHONE ENCOUNTER
Ret'd call to Pt and called three times  All three times the call was declined  Could not leave a message

## 2022-04-03 PROBLEM — Z01.419 ENCOUNTER FOR ANNUAL ROUTINE GYNECOLOGICAL EXAMINATION: Status: ACTIVE | Noted: 2022-04-03

## 2022-04-03 NOTE — PROGRESS NOTES
Assessment/Plan:  PORTER  BCM- Bianca - menstrual headache due to estrogen withdrawal, recommend continous OCs  Explained, Call p r n  Co Testing q 5 yrs  '24  RTO 1yr  SBA monthly  Exercise 3/wk  Calcium 1,000 mg/d with Vit D      Depression Screen: Neg       Diagnoses and all orders for this visit:    Encounter for annual routine gynecological examination  -     drospirenone-ethinyl estradiol (BIANCA) 3-0 02 MG per tablet; One tab p o  daily, discard the placebo pills and begin a new pack  You will be going through a pack every 3 we    Encounter for prescription of oral contraceptives  -     drospirenone-ethinyl estradiol (BIANCA) 3-0 02 MG per tablet; One tab p o  daily, discard the placebo pills and begin a new pack  You will be going through a pack every 3 we    Menstrual migraine without status migrainosus, not intractable  -     drospirenone-ethinyl estradiol (BIANCA) 3-0 02 MG per tablet; One tab p o  daily, discard the placebo pills and begin a new pack  You will be going through a pack every 3 we              Subjective:        Patient ID: Prabhjot Jackson is a 29 y o  female  Roxi Stearns returns today for a yearly evaluation  She has been experiencing headaches the day of her menses or the day after  They are associated with photophobia  Her menses are very light  She does not have headaches in between menses  Her health has not changed otherwise  Advil does not provide any relief  The following portions of the patient's history were reviewed and updated as appropriate: She  has no past medical history on file    Patient Active Problem List    Diagnosis Date Noted    Menstrual migraine without status migrainosus, not intractable 04/04/2022    Encounter for annual routine gynecological examination 04/03/2022    Encounter for counseling regarding contraception 04/27/2020    Non compliance w medication regimen 04/27/2020    Vaginal discharge 04/27/2020    Vulvar itching 04/27/2020    Vulvar irritation 12/09/2019    Encntr for gyn exam (general) (routine) w/o abn findings 04/24/2019    Encounter for prescription of oral contraceptives 04/24/2019    Postoperative hypothyroidism 01/29/2018    Hx of papillary thyroid carcinoma 03/22/2016   PMH:  G2, P2; SAVD x 2, '06 F, '08 M  Partial Thyroidectomy- Papillary Thyroid Ca '16  Post Op Hypothyroidism  Acne '19  Vulvar irritation 12/19, 4/20, 1/21  [Monilia]  She  has a past surgical history that includes Thyroid surgery  Her family history includes No Known Problems in her brother, brother, brother, daughter, father, maternal grandfather, maternal grandmother, mother, paternal grandfather, paternal grandmother, and son  She  reports that she has been smoking  She uses smokeless tobacco  She reports previous alcohol use  She reports that she does not use drugs  SH:   to [address] [facility] [employer] employee who is 8 yrs her senior   had mitral valve replacement 12/21  Current Outpatient Medications   Medication Sig Dispense Refill    drospirenone-ethinyl estradiol (BIANCA) 3-0 02 MG per tablet One tab p o  daily, discard the placebo pills and begin a new pack  You will be going through a pack every 3 we 28 tablet 17    fluticasone (FLONASE) 50 mcg/act nasal spray 2 sprays into each nostril daily 16 g 0    levothyroxine 25 mcg tablet Take 1 tablet (25 mcg total) by mouth daily 90 tablet 3    tretinoin (RETIN-A) 0 025 % cream Apply pea sized amount topically to face one hour before bedtime  45 g 6     No current facility-administered medications for this visit  Current Outpatient Medications on File Prior to Visit   Medication Sig    fluticasone (FLONASE) 50 mcg/act nasal spray 2 sprays into each nostril daily    levothyroxine 25 mcg tablet Take 1 tablet (25 mcg total) by mouth daily    tretinoin (RETIN-A) 0 025 % cream Apply pea sized amount topically to face one hour before bedtime      [DISCONTINUED] drospirenone-ethinyl estradiol (BIANCA) 3-0 02 MG per tablet Take 1 tablet by mouth daily     No current facility-administered medications on file prior to visit  She is allergic to acetaminophen and ciprofloxacin       Review of Systems   Constitutional: Negative for activity change, appetite change, fatigue and unexpected weight change  Eyes: Negative for visual disturbance  Respiratory: Negative for cough, chest tightness, shortness of breath and wheezing  Cardiovascular: Negative for chest pain, palpitations and leg swelling  Breast: Patient denies tenderness, nipple discharge, masses, or erythema  Gastrointestinal: Negative for abdominal distention, abdominal pain, blood in stool, constipation, diarrhea, nausea and vomiting  Endocrine: Negative for cold intolerance and heat intolerance  Genitourinary: Negative for decreased urine volume, difficulty urinating, dyspareunia, dysuria, frequency, hematuria, menstrual problem, pelvic pain, urgency, vaginal bleeding, vaginal discharge and vaginal pain  Musculoskeletal: Negative for arthralgias  Skin: Negative for rash  Neurological: Positive for headaches  Negative for weakness, light-headedness and numbness  Hematological: Does not bruise/bleed easily  Psychiatric/Behavioral: Negative for agitation, behavioral problems and sleep disturbance  The patient is not nervous/anxious  Objective:    Vitals:    04/04/22 1103   BP: 120/60   Weight: 52 2 kg (115 lb)   Height: 5' 2 5" (1 588 m)            Physical Exam  Vitals and nursing note reviewed  Exam conducted with a chaperone present  Constitutional:       General: She is not in acute distress  Appearance: She is well-developed  HENT:      Head: Normocephalic and atraumatic  Eyes:      General: No scleral icterus  Right eye: No discharge  Left eye: No discharge  Extraocular Movements: Extraocular movements intact  Conjunctiva/sclera: Conjunctivae normal    Neck:      Thyroid: No thyromegaly  Trachea: No tracheal deviation  Cardiovascular:      Rate and Rhythm: Normal rate and regular rhythm  Heart sounds: Normal heart sounds  No murmur heard  Pulmonary:      Effort: Pulmonary effort is normal  No respiratory distress  Breath sounds: Normal breath sounds  No wheezing  Chest:   Breasts: Breasts are symmetrical       Right: No inverted nipple, mass, nipple discharge, skin change or tenderness  Left: No inverted nipple, mass, nipple discharge, skin change or tenderness  Abdominal:      General: Bowel sounds are normal  There is no distension  Palpations: Abdomen is soft  There is no mass  Tenderness: There is no abdominal tenderness  There is no guarding or rebound  Genitourinary:     General: Normal vulva  Labia:         Right: No rash, tenderness or lesion  Left: No rash, tenderness or lesion  Vagina: Normal       Cervix: No cervical motion tenderness or discharge  Uterus: Not deviated, not enlarged and not tender  Adnexa:         Right: No mass, tenderness or fullness  Left: No mass, tenderness or fullness  Rectum: No external hemorrhoid  Comments: Urethral meatus within normal limits  Perineum within normal limits  Bladder well supported  Musculoskeletal:         General: No tenderness  Normal range of motion  Cervical back: Normal range of motion and neck supple  Lymphadenopathy:      Cervical: No cervical adenopathy  Skin:     General: Skin is warm and dry  Neurological:      Mental Status: She is alert and oriented to person, place, and time  Psychiatric:         Mood and Affect: Mood normal          Behavior: Behavior normal          Thought Content:  Thought content normal          Judgment: Judgment normal

## 2022-04-04 ENCOUNTER — ANNUAL EXAM (OUTPATIENT)
Dept: OBGYN CLINIC | Facility: CLINIC | Age: 37
End: 2022-04-04
Payer: COMMERCIAL

## 2022-04-04 VITALS
HEIGHT: 63 IN | WEIGHT: 115 LBS | SYSTOLIC BLOOD PRESSURE: 120 MMHG | BODY MASS INDEX: 20.38 KG/M2 | DIASTOLIC BLOOD PRESSURE: 60 MMHG

## 2022-04-04 DIAGNOSIS — G43.829 MENSTRUAL MIGRAINE WITHOUT STATUS MIGRAINOSUS, NOT INTRACTABLE: ICD-10-CM

## 2022-04-04 DIAGNOSIS — Z01.419 ENCOUNTER FOR ANNUAL ROUTINE GYNECOLOGICAL EXAMINATION: Primary | ICD-10-CM

## 2022-04-04 DIAGNOSIS — Z30.011 ENCOUNTER FOR PRESCRIPTION OF ORAL CONTRACEPTIVES: ICD-10-CM

## 2022-04-04 PROCEDURE — S0612 ANNUAL GYNECOLOGICAL EXAMINA: HCPCS | Performed by: OBSTETRICS & GYNECOLOGY

## 2022-04-04 RX ORDER — DROSPIRENONE AND ETHINYL ESTRADIOL 0.02-3(28)
KIT ORAL
Qty: 28 TABLET | Refills: 17 | Status: SHIPPED | OUTPATIENT
Start: 2022-04-04

## 2022-05-11 ENCOUNTER — TELEPHONE (OUTPATIENT)
Dept: DERMATOLOGY | Facility: CLINIC | Age: 37
End: 2022-05-11

## 2022-05-11 NOTE — TELEPHONE ENCOUNTER
Pt requested to schedule an appt as a returning pt, she usually contreras see Dr Leslee Ziegler, but wants to get in sooner to get her refill so she requested to see Dr Song Level

## 2022-06-07 ENCOUNTER — TELEPHONE (OUTPATIENT)
Dept: OBGYN CLINIC | Facility: CLINIC | Age: 37
End: 2022-06-07

## 2022-06-07 NOTE — TELEPHONE ENCOUNTER
Pt saw you in April for annual  She called to inform she has pain in lower belly, white, thick discharge and notices some swelling on outside of vagina  Denies any odor  Said she had this a couple of weeks ago but went away and now came back, had this for past 3 days   OVS? Please advise

## 2022-06-07 NOTE — TELEPHONE ENCOUNTER
Pt wants apt for her pain, advised she also try monistat or yeast, soonest avail apt given, advised she try urgent care if pain gets worse

## 2022-06-12 PROBLEM — R10.2 PELVIC PAIN: Status: ACTIVE | Noted: 2022-06-12

## 2022-06-12 PROBLEM — R14.0 BLOATING: Status: ACTIVE | Noted: 2022-06-12

## 2022-06-13 ENCOUNTER — OFFICE VISIT (OUTPATIENT)
Dept: OBGYN CLINIC | Facility: CLINIC | Age: 37
End: 2022-06-13
Payer: COMMERCIAL

## 2022-06-13 VITALS — SYSTOLIC BLOOD PRESSURE: 88 MMHG | BODY MASS INDEX: 21.02 KG/M2 | DIASTOLIC BLOOD PRESSURE: 60 MMHG | WEIGHT: 116.8 LBS

## 2022-06-13 DIAGNOSIS — R14.0 BLOATING: ICD-10-CM

## 2022-06-13 DIAGNOSIS — B37.3 VAGINAL MONILIASIS: ICD-10-CM

## 2022-06-13 DIAGNOSIS — R10.2 PELVIC PAIN: Primary | ICD-10-CM

## 2022-06-13 DIAGNOSIS — N89.8 VAGINAL ITCHING: ICD-10-CM

## 2022-06-13 DIAGNOSIS — N89.8 VAGINAL DISCHARGE: ICD-10-CM

## 2022-06-13 PROBLEM — B37.31 VAGINAL MONILIASIS: Status: ACTIVE | Noted: 2022-06-13

## 2022-06-13 PROCEDURE — 99213 OFFICE O/P EST LOW 20 MIN: CPT | Performed by: OBSTETRICS & GYNECOLOGY

## 2022-06-13 PROCEDURE — 87210 SMEAR WET MOUNT SALINE/INK: CPT | Performed by: OBSTETRICS & GYNECOLOGY

## 2022-06-13 RX ORDER — FLUCONAZOLE 150 MG/1
150 TABLET ORAL ONCE
Qty: 1 TABLET | Refills: 3 | Status: SHIPPED | OUTPATIENT
Start: 2022-06-13 | End: 2022-06-13

## 2022-06-13 NOTE — PROGRESS NOTES
Assessment/Plan:  Symptoms for the most part resolved  Right adnexal tenderness with history of pelvic pain  Recurrent moniliasis    Recommend ultrasound of the pelvis both transvaginal and transabdominal  Diflucan with 3 refills       Diagnoses and all orders for this visit:    Pelvic pain  -     US pelvis complete w transvaginal; Future    Bloating  -     US pelvis complete w transvaginal; Future    Vaginal discharge  -     POCT wet mount    Vaginal itching  -     POCT wet mount    Vaginal moniliasis  -     fluconazole (DIFLUCAN) 150 mg tablet; Take 1 tablet (150 mg total) by mouth once for 1 dose              Subjective:        Patient ID: Izabela Ross is a 29 y o  female  Azalee Belling presents complaining of a 1 week history of pelvic pain especially in the right lower quadrant, abdominal bloating, vaginal discharge with itching and vaginal swelling  Almost all of her symptoms resolved by yesterday  She continues to take Toshia  She had intercourse yesterday which her   There was entrance dyspareunia  She denies changing soaps or detergents  Her  has not changed soaps  She is not using any new medications or herbal products  She has not been on antibiotics  She does have a history of frequent yeast infections at resolved with Diflucan  The following portions of the patient's history were reviewed and updated as appropriate: She  has no past medical history on file    Patient Active Problem List    Diagnosis Date Noted    Vaginal moniliasis 06/13/2022    Pelvic pain 06/12/2022    Bloating 06/12/2022    Menstrual migraine without status migrainosus, not intractable 04/04/2022    Encounter for annual routine gynecological examination 04/03/2022    Encounter for counseling regarding contraception 04/27/2020    Non compliance w medication regimen 04/27/2020    Vaginal discharge 04/27/2020    Vulvar itching 04/27/2020    Vulvar irritation 12/09/2019    Encntr for gyn exam (general) (routine) w/o abn findings 04/24/2019    Encounter for prescription of oral contraceptives 04/24/2019    Postoperative hypothyroidism 01/29/2018    Hx of papillary thyroid carcinoma 03/22/2016   PMH:  G2, P2; SAVD x 2, '06 F, '08 M  Partial Thyroidectomy- Papillary Thyroid Ca '16  Post Op Hypothyroidism  Acne '19  Vulvar irritation 12/19, 4/20, 1/21  [Monilia]  She  has a past surgical history that includes Thyroid surgery  Her family history includes No Known Problems in her brother, brother, brother, daughter, father, maternal grandfather, maternal grandmother, mother, paternal grandfather, paternal grandmother, and son  She  reports that she has quit smoking  She uses smokeless tobacco  She reports previous alcohol use  She reports that she does not use drugs  Current Outpatient Medications   Medication Sig Dispense Refill    drospirenone-ethinyl estradiol (BIANCA) 3-0 02 MG per tablet One tab p o  daily, discard the placebo pills and begin a new pack  You will be going through a pack every 3 we 28 tablet 17    fluconazole (DIFLUCAN) 150 mg tablet Take 1 tablet (150 mg total) by mouth once for 1 dose 1 tablet 3    fluticasone (FLONASE) 50 mcg/act nasal spray 2 sprays into each nostril daily 16 g 0    levothyroxine 25 mcg tablet Take 1 tablet (25 mcg total) by mouth daily 90 tablet 3    tretinoin (RETIN-A) 0 025 % cream Apply pea sized amount topically to face one hour before bedtime  45 g 6     No current facility-administered medications for this visit  Current Outpatient Medications on File Prior to Visit   Medication Sig    drospirenone-ethinyl estradiol (BIANCA) 3-0 02 MG per tablet One tab p o  daily, discard the placebo pills and begin a new pack    You will be going through a pack every 3 we    fluticasone (FLONASE) 50 mcg/act nasal spray 2 sprays into each nostril daily    levothyroxine 25 mcg tablet Take 1 tablet (25 mcg total) by mouth daily    tretinoin (RETIN-A) 0 025 % cream Apply pea sized amount topically to face one hour before bedtime  No current facility-administered medications on file prior to visit  She is allergic to acetaminophen and ciprofloxacin       Review of Systems   Constitutional: Negative  Respiratory: Negative for shortness of breath  Cardiovascular: Negative for chest pain  Gastrointestinal: Negative for abdominal distention, constipation, diarrhea, nausea and vomiting  Genitourinary: Positive for dyspareunia, pelvic pain, vaginal discharge and vaginal pain  Negative for difficulty urinating, dysuria, flank pain, frequency, genital sores, hematuria, menstrual problem and urgency  Musculoskeletal: Negative for arthralgias  Skin: Negative for rash  Neurological: Negative for weakness  Hematological: Negative for adenopathy  Does not bruise/bleed easily  Psychiatric/Behavioral: The patient is not nervous/anxious  Objective:    Vitals:    06/13/22 1257   BP: (!) 88/60   Weight: 53 kg (116 lb 12 8 oz)            Physical Exam  Vitals and nursing note reviewed  Exam conducted with a chaperone present  Constitutional:       General: She is not in acute distress  Appearance: Normal appearance  She is normal weight  She is not ill-appearing  Eyes:      General: No scleral icterus  Right eye: No discharge  Left eye: No discharge  Extraocular Movements: Extraocular movements intact  Pulmonary:      Effort: Pulmonary effort is normal  No respiratory distress  Abdominal:      General: Abdomen is flat  There is no distension  Palpations: Abdomen is soft  There is no mass  Tenderness: There is abdominal tenderness (Right lower quadrant)  There is no right CVA tenderness, left CVA tenderness, guarding or rebound  Genitourinary:     General: Normal vulva  Comments: There is a thin white vaginal discharge with a pH of 4 5  Vaginal wall is tender  Uterus is anteverted normal size and nontender    There is right adnexal tenderness but no palpable mass  Left is normal   Musculoskeletal:      Right lower leg: No edema  Left lower leg: No edema  Skin:     General: Skin is warm and dry  Neurological:      Mental Status: She is alert and oriented to person, place, and time  Psychiatric:         Mood and Affect: Mood normal          Thought Content:  Thought content normal          Judgment: Judgment normal

## 2022-06-20 ENCOUNTER — OFFICE VISIT (OUTPATIENT)
Dept: DERMATOLOGY | Facility: CLINIC | Age: 37
End: 2022-06-20
Payer: COMMERCIAL

## 2022-06-20 VITALS — BODY MASS INDEX: 20.2 KG/M2 | TEMPERATURE: 98.6 F | WEIGHT: 114 LBS | HEIGHT: 63 IN

## 2022-06-20 DIAGNOSIS — L70.0 ACNE VULGARIS: Primary | ICD-10-CM

## 2022-06-20 PROCEDURE — 99212 OFFICE O/P EST SF 10 MIN: CPT | Performed by: DERMATOLOGY

## 2022-06-20 NOTE — PROGRESS NOTES
[facility] Follow Up Note    Patient Name: Basil Cannon  Encounter Date: 6/20/2022    Today's Chief Concerns:  Tish Benitez Concern #1: Acne follow up    Current Medications:    Current Outpatient Medications:     levothyroxine 25 mcg tablet, Take 1 tablet (25 mcg total) by mouth daily, Disp: 90 tablet, Rfl: 3    tretinoin (RETIN-A) 0 025 % cream, Apply pea sized amount topically to face one hour before bedtime  , Disp: 45 g, Rfl: 6    drospirenone-ethinyl estradiol (BIANCA) 3-0 02 MG per tablet, One tab p o  daily, discard the placebo pills and begin a new pack  You will be going through a pack every 3 we (Patient not taking: Reported on 6/20/2022), Disp: 28 tablet, Rfl: 17    fluticasone (FLONASE) 50 mcg/act nasal spray, 2 sprays into each nostril daily (Patient not taking: Reported on 6/20/2022), Disp: 16 g, Rfl: 0    CONSTITUTIONAL:   Vitals:    06/20/22 1022   Temp: 98 6 °F (37 °C)   TempSrc: Temporal   Weight: 51 7 kg (114 lb)   Height: 5' 2 5" (1 588 m)       Specific Alerts:    Have you been seen by a [facility] in the last 3 years? YES    Are you pregnant or planning to become pregnant? No    Are you currently or planning to be nursing or breast feeding? No    Allergies   Allergen Reactions    Acetaminophen Hives    Ciprofloxacin Hives     To be further evaluated by allergy specialist per Dr Sari Silvestre we call your Preferred Phone number to discuss your specific medical information? YES    May we leave a detailed message that includes your specific medical information? YES    Have you traveled outside of the [facility] in the past 3 months? No    Do you currently have a pacemaker or defibrillator? No    Do you have any artificial heart valves, joints, plates, screws, rods, stents, pins, etc? No   - If Yes, were any placed within the last 2 years? Do you require any medications prior to a surgical procedure? No   - If Yes, for which procedure?     - If Yes, what medications to you require? Are you taking any medications that cause you to bleed more easily ("blood thinners") No    Have you ever experienced a rapid heartbeat with epinephrine? No    Have you ever been treated with "gold" (gold sodium thiomalate) therapy? No    [facility] can help with wrinkles, "laugh lines," facial volume loss, "double chin," "love handles," age spots, and more  Are you interested in learning today about some of the skin enhancement procedures that we offer? (If Yes, please provide more detail) No    Review of Systems:  Have you recently had or currently have any of the following? · Fever or chills: No  · Night Sweats: No  · Headaches: YES  · Weight Gain: No  · Weight Loss: No  · Blurry Vision: No  · Nausea: No  · Vomiting: No  · Diarrhea: No  · Blood in Stool: No  · Abdominal Pain: No  · Itchy Skin: YES  · Painful Joints: No  · Swollen Joints: No  · Muscle Pain: No  · Irregular Mole: No  · Sun Burn: No  · Dry Skin: YES  · Skin Color Changes: No  · Scar or Keloid: No  · Cold Sores/Fever Blisters: No  · Bacterial Infections/MRSA: No  · Anxiety: No  · Depression: No  · Suicidal or Homicidal Thoughts: No      PSYCH: Normal mood and affect  EYES: Normal conjunctiva  ENT: Normal lips and oral mucosa  CARDIOVASCULAR: No edema  RESPIRATORY: Normal respirations  HEME/LYMPH/IMMUNO:  No regional lymphadenopathy except as noted below in ASSESSMENT AND PLAN BY DIAGNOSIS    FULL ORGAN SYSTEM SKIN EXAM (SKIN)  Hair, Scalp, Ears, Face Normal except as noted below in Assessment         1  ACNE VULGARIS ("COMMON ACNE")    Physical Exam:   Anatomic Location Affected: face,   Morphological Description: See photos below                     Additional History of Present Condition:  Patient was using prescribed tretinoin 0 025% cream but recently ran out  Patient notes her acne gets worse during her menstrual cycle  Leaving more hyperpigmentation after it resolves   Patient is also using a product listed above for her acne, containing salicylic acid  Assessment and Plan:   We reviewed the causes of acne, the kinds of acne, and the expected clinical course   We discussed treatment options ranging from over-the-counter products, topical retinoids, antibiotics, BP, hormonal therapies (OCPs/spironolactone), and isotretinoin (Accutane)   We reviewed specific over-the-counter interventions and medications  Recommended typical hygiene measures washing regularly with mild cleanser, and refraining from picking and popping any pimples  Recommended non-comedogenic sunscreen use daily   Expectations of therapy discussed  Side effects, risks and benefits of medications discussed  A comprehensive handout with treatment plan provided  The phone number to call in case of questions or concerns (and instructions to stop medications in such a scenario) was provided   After lengthy discussion of etiology and treatment options, we decided to implement the following personalized treatment plan:      Mornin  Continue Washing face  with over the counter Cerave cleanser or your own preferred face wash as long as it doesn't cause any irritation  2  Follow with an oil-free sunscreen (SPF 30 or higher)  Some options include Neutrogena oil-free moisturizer with SPF     Night:    1  Wash your face with a gentle face wash - like Cetaphil wash, Cerave Hydrating , LaRoche Hydrating Cleanser   2  Patient will be getting laser hair removal on face area in July  Recommend patient to stop applying the tretinoin two weeks before any laser treatment  3  Will prescribe a topical tretinoin compound cream through [facility]  (Patient will call if topical cream fails to improve )    Follow up in 3 months for check up  [facility]     We use this service to prescribe medications that are often not covered by insurance  Your physician will send your prescription to the pharmacy      You will receive an email from [employer] where you will follow the instructions within the email to provide your billing and shipping information  Your medicine will be shipped directly to you  If you have any questions, you can call [phone] or email [email or web address]      Make sure all products you use on face are labeled as "non-comedongenic" or "oil-free" or " does not clog pores"  Acne can be frustrating and difficult to treat  Most acne regimens take 2-3 months to see an improvement, so stick with them  Don't give up! As always, call your doctor if you have any concerns about your medications        Scribe Attestation    I,:  Suzy Estrada MA am acting as a scribe while in the presence of the attending physician :       I,:  Ephraim Fritz MD personally performed the services described in this documentation    as scribed in my presence :

## 2022-06-20 NOTE — PATIENT INSTRUCTIONS
1  ACNE VULGARIS ("COMMON ACNE")        Assessment and Plan: We reviewed the causes of acne, the kinds of acne, and the expected clinical course  We discussed treatment options ranging from over-the-counter products, topical retinoids, antibiotics, BP, hormonal therapies (OCPs/spironolactone), and isotretinoin (Accutane)  We reviewed specific over-the-counter interventions and medications  Recommended typical hygiene measures washing regularly with mild cleanser, and refraining from picking and popping any pimples  Recommended non-comedogenic sunscreen use daily  Expectations of therapy discussed  Side effects, risks and benefits of medications discussed  A comprehensive handout with treatment plan provided  The phone number to call in case of questions or concerns (and instructions to stop medications in such a scenario) was provided  After lengthy discussion of etiology and treatment options, we decided to implement the following personalized treatment plan:      Mornin  Continue Washing face  with over the counter Cerave cleanser or your own preferred face wash as long as it doesn't cause any irritation  2  Follow with an oil-free sunscreen (SPF 30 or higher)  Some options include Neutrogena oil-free moisturizer with SPF     Night:    1  Wash your face with a gentle face wash - like Cetaphil wash, Cerave Hydrating , LaRoche Hydrating Cleanser   2  Patient will be getting laser hair removal on face area in July  Recommend patient to stop applying the tretinoin two weeks before any laser treatment  3  Will prescribe a topical tretinoin compound cream through [facility]  (Patient will call if topical cream fails to improve )     Follow up in 3 months for check up  [facility]     We use this service to prescribe medications that are often not covered by insurance  Your physician will send your prescription to the pharmacy      You will receive an email from [email or web address] where you will follow the instructions within the email to provide your billing and shipping information  Your medicine will be shipped directly to you  If you have any questions, you can call [phone] or email [email or web address]      Make sure all products you use on face are labeled as "non-comedongenic" or "oil-free" or " does not clog pores"  Acne can be frustrating and difficult to treat  Most acne regimens take 2-3 months to see an improvement, so stick with them  Don't give up! As always, call your doctor if you have any concerns about your medications

## 2022-06-29 ENCOUNTER — TELEPHONE (OUTPATIENT)
Dept: OBGYN CLINIC | Age: 37
End: 2022-06-29

## 2022-07-11 ENCOUNTER — HOSPITAL ENCOUNTER (OUTPATIENT)
Dept: ULTRASOUND IMAGING | Facility: HOSPITAL | Age: 37
Discharge: HOME | End: 2022-07-11
Attending: OBSTETRICS & GYNECOLOGY
Payer: COMMERCIAL

## 2022-07-11 DIAGNOSIS — R10.2 PELVIC PAIN: ICD-10-CM

## 2022-07-11 DIAGNOSIS — R14.0 BLOATING: ICD-10-CM

## 2022-07-11 PROCEDURE — 76856 US EXAM PELVIC COMPLETE: CPT

## 2022-07-11 PROCEDURE — 76830 TRANSVAGINAL US NON-OB: CPT

## 2022-09-02 ENCOUNTER — OFFICE VISIT (OUTPATIENT)
Dept: ENDOCRINOLOGY | Facility: CLINIC | Age: 37
End: 2022-09-02
Payer: COMMERCIAL

## 2022-09-02 VITALS
SYSTOLIC BLOOD PRESSURE: 100 MMHG | HEIGHT: 63 IN | HEART RATE: 88 BPM | BODY MASS INDEX: 19.67 KG/M2 | WEIGHT: 111 LBS | DIASTOLIC BLOOD PRESSURE: 70 MMHG

## 2022-09-02 DIAGNOSIS — Z85.850 HX OF PAPILLARY THYROID CARCINOMA: Primary | ICD-10-CM

## 2022-09-02 DIAGNOSIS — E89.0 POSTOPERATIVE HYPOTHYROIDISM: ICD-10-CM

## 2022-09-02 PROCEDURE — 99214 OFFICE O/P EST MOD 30 MIN: CPT | Performed by: INTERNAL MEDICINE

## 2022-09-02 RX ORDER — LEVOTHYROXINE SODIUM 0.03 MG/1
25 TABLET ORAL DAILY
Qty: 90 TABLET | Refills: 3 | Status: SHIPPED | OUTPATIENT
Start: 2022-09-02

## 2022-09-02 NOTE — PROGRESS NOTES
Nguyễn Julian 29 y o  female MRN: [id number]    Encounter: [id number]      Assessment/Plan     Assessment: This is a 29y o -year-old female with papillary thyroid cancer and postoperative hypothyroidism  Plan:  1  Papillary thyroid cancer-repeat ultrasound of the thyroid  Since she only had a hemithyroidectomy, thyroglobulin is inappropriate to check  2  Postoperative hypothyroidism-check TSH and free T4 in about four weeks since she has not been taking her levothyroxine on a regular basis while on vacation  3   The medical conditions may fluctuate over time  This may require changes and/or addition of medications as well as orders for additional laboratory/imaging tests  CC:   Thyroid cancer    History of Present Illness      HPI:  27-year-old woman with thyroid cancer that was 1 2 cm in size presents for follow-up  She was on vacation for about a month in [facility] and was not taking her levothyroxine on a consistent basis  She denies any symptoms of hypo or hyperthyroidism  She denies any difficulty swallowing or breathing  For postoperative hypothyroidism, she is on small dose of levothyroxine  Review of Systems   Constitutional: Negative for chills and fever  Respiratory: Negative for shortness of breath  Cardiovascular: Negative for chest pain  Gastrointestinal: Negative for constipation, diarrhea, nausea and vomiting  Endocrine: Negative for cold intolerance and heat intolerance  All other systems reviewed and are negative  Historical Information   No past medical history on file    Past Surgical History:   Procedure Laterality Date    THYROID SURGERY       Social History   Social History     Substance and Sexual Activity   Alcohol Use Not Currently     Social History     Substance and Sexual Activity   Drug Use Never     Social History     Tobacco Use   Smoking Status Former Smoker   Smokeless Tobacco Current User   Tobacco Comment    hooka daily in the summer, winter once per week     Family History:   Family History   Problem Relation Age of Onset    No Known Problems Mother     No Known Problems Father     No Known Problems Brother     No Known Problems Daughter     No Known Problems Son     No Known Problems Maternal Grandmother     No Known Problems Maternal Grandfather     No Known Problems Paternal Grandmother     No Known Problems Paternal Grandfather     No Known Problems Brother     No Known Problems Brother        Meds/Allergies   Current Outpatient Medications   Medication Sig Dispense Refill    tretinoin (RETIN-A) 0 025 % cream Apply pea sized amount topically to face one hour before bedtime  45 g 6    drospirenone-ethinyl estradiol (BIANCA) 3-0 02 MG per tablet One tab p o  daily, discard the placebo pills and begin a new pack  You will be going through a pack every 3 we (Patient not taking: No sig reported) 28 tablet 17    fluticasone (FLONASE) 50 mcg/act nasal spray 2 sprays into each nostril daily (Patient not taking: No sig reported) 16 g 0    levothyroxine 25 mcg tablet Take 1 tablet (25 mcg total) by mouth daily 90 tablet 3     No current facility-administered medications for this visit  Allergies   Allergen Reactions    Acetaminophen Hives    Ciprofloxacin Hives     To be further evaluated by allergy specialist per Dr Nuno Guo       Objective   Vitals: Blood pressure 100/70, pulse 88, height 5' 2 5" (1 588 m), weight 50 3 kg (111 lb)  Physical Exam  Vitals reviewed  Constitutional:       General: She is not in acute distress  Appearance: She is well-developed  She is not diaphoretic  HENT:      Head: Normocephalic and atraumatic  Mouth/Throat:      Pharynx: No oropharyngeal exudate  Eyes:      General: Lids are normal  No scleral icterus  Right eye: No discharge  Left eye: No discharge  Conjunctiva/sclera: Conjunctivae normal    Neck:      Thyroid: No thyromegaly     Cardiovascular:      Rate and Rhythm: Normal rate and regular rhythm  Heart sounds: Normal heart sounds  No murmur heard  No friction rub  No gallop  Pulmonary:      Effort: Pulmonary effort is normal  No respiratory distress  Breath sounds: Normal breath sounds  No wheezing  Chest:   Breasts:      Right: No supraclavicular adenopathy  Left: No supraclavicular adenopathy  Abdominal:      General: Bowel sounds are normal  There is no distension  Palpations: Abdomen is soft  Tenderness: There is no abdominal tenderness  Musculoskeletal:         General: No tenderness or deformity  Normal range of motion  Cervical back: Neck supple  Lymphadenopathy:      Head:      Right side of head: No occipital adenopathy  Left side of head: No occipital adenopathy  Upper Body:      Right upper body: No supraclavicular adenopathy  Left upper body: No supraclavicular adenopathy  Skin:     General: Skin is warm  Findings: No erythema or rash  Neurological:      Mental Status: She is alert and oriented to person, place, and time  Cranial Nerves: No cranial nerve deficit  Psychiatric:         Mood and Affect: Mood normal          Behavior: Behavior normal          The history was obtained from the review of the chart, patient  Lab Results:      Imaging Studies:  Results for orders placed during the hospital encounter of 08/27/21    US head neck lymph node mapping    Impression  1  Stable postoperative change reflecting previous left hemithyroidectomy  Small stable soft tissue structure identified in the operative bed likely representing a small amount of residual thyroid seen on studies dating back to 8/8/2019   2   No evidence for recurrent/metastatic disease in the neck  Workstation performed: [id number]         I have personally reviewed pertinent reports  Portions of the record may have been created with voice recognition software   Occasional wrong word or "sound a like" substitutions may have occurred due to the inherent limitations of voice recognition software  Read the chart carefully and recognize, using context, where substitutions have occurred

## 2022-09-29 ENCOUNTER — OFFICE VISIT (OUTPATIENT)
Dept: DERMATOLOGY | Facility: CLINIC | Age: 37
End: 2022-09-29
Payer: COMMERCIAL

## 2022-09-29 VITALS — WEIGHT: 114.9 LBS | TEMPERATURE: 99.3 F | HEIGHT: 63 IN | BODY MASS INDEX: 20.36 KG/M2

## 2022-09-29 DIAGNOSIS — L70.0 ACNE VULGARIS: Primary | ICD-10-CM

## 2022-09-29 PROCEDURE — 99213 OFFICE O/P EST LOW 20 MIN: CPT | Performed by: STUDENT IN AN ORGANIZED HEALTH CARE EDUCATION/TRAINING PROGRAM

## 2022-09-29 RX ORDER — FLUCONAZOLE 150 MG/1
TABLET ORAL
COMMUNITY
Start: 2022-09-02

## 2022-09-29 RX ORDER — SPIRONOLACTONE 50 MG/1
100 TABLET, FILM COATED ORAL DAILY
Qty: 90 TABLET | Refills: 1 | Status: SHIPPED | OUTPATIENT
Start: 2022-09-29

## 2022-09-29 NOTE — PROGRESS NOTES
[facility] Follow Up Note    Patient Name: Joanna Corea  Encounter Date: 9/29/2022    Today's Chief Concerns:  Bharat Pert Concern #1: Acne follow up     Current Medications:    Current Outpatient Medications:     fluconazole (DIFLUCAN) 150 mg tablet, , Disp: , Rfl:     levothyroxine 25 mcg tablet, Take 1 tablet (25 mcg total) by mouth daily, Disp: 90 tablet, Rfl: 3    tretinoin (RETIN-A) 0 025 % cream, Apply pea sized amount topically to face one hour before bedtime  , Disp: 45 g, Rfl: 6    drospirenone-ethinyl estradiol (BIANCA) 3-0 02 MG per tablet, One tab p o  daily, discard the placebo pills and begin a new pack  You will be going through a pack every 3 we (Patient not taking: No sig reported), Disp: 28 tablet, Rfl: 17    fluticasone (FLONASE) 50 mcg/act nasal spray, 2 sprays into each nostril daily (Patient not taking: No sig reported), Disp: 16 g, Rfl: 0    CONSTITUTIONAL:   Vitals:    09/29/22 1116   Temp: 99 3 °F (37 4 °C)   TempSrc: Temporal   Weight: 52 1 kg (114 lb 14 4 oz)   Height: 5' 2 5" (1 588 m)       *    Specific Alerts:    Have you been seen by a [facility] Dermatologist in the last 3 years? YES    Are you pregnant or planning to become pregnant? No    Are you currently or planning to be nursing or breast feeding? No    Allergies   Allergen Reactions    Acetaminophen Hives    Ciprofloxacin Hives     To be further evaluated by allergy specialist per Dr Montelongo Layer we call your Preferred Phone number to discuss your specific medical information? YES    May we leave a detailed message that includes your specific medical information? YES    Have you traveled outside of the [facility] in the past 3 months? YES    Do you currently have a pacemaker or defibrillator? No    Do you have any artificial heart valves, joints, plates, screws, rods, stents, pins, etc? No   - If Yes, were any placed within the last 2 years?     Do you require any medications prior to a surgical procedure? No   - If Yes, for which procedure? - If Yes, what medications to you require? Are you taking any medications that cause you to bleed more easily ("blood thinners") No    Have you ever experienced a rapid heartbeat with epinephrine? No    Have you ever been treated with "gold" (gold sodium thiomalate) therapy? [facility] can help with wrinkles, "laugh lines," facial volume loss, "double chin," "love handles," age spots, and more  Are you interested in learning today about some of the skin enhancement procedures that we offer? (If Yes, please provide more detail)     Review of Systems:  Have you recently had or currently have any of the following? · Fever or chills: No  · Night Sweats: No  · Headaches: No  · Weight Gain: No  · Weight Loss: No  · Blurry Vision: No  · Nausea: No  · Vomiting: No  · Diarrhea: No  · Blood in Stool: No  · Abdominal Pain: No  · Itchy Skin: No  · Painful Joints: No  · Swollen Joints: No  · Muscle Pain: No  · Irregular Mole: No  · Sun Burn: No  · Dry Skin: No  · Skin Color Changes: No  · Scar or Keloid: No  · Cold Sores/Fever Blisters: No  · Bacterial Infections/MRSA: No  · Anxiety: No  · Depression: No  · Suicidal or Homicidal Thoughts: No    PSYCH: Normal mood and affect  EYES: Normal conjunctiva  ENT: Normal lips and oral mucosa  CARDIOVASCULAR: No edema  RESPIRATORY: Normal respirations  HEME/LYMPH/IMMUNO:  No ostensible subQ swelling except as noted below in ASSESSMENT AND PLAN BY DIAGNOSIS    FOCUSED ORGAN SYSTEM SKIN EXAM (SKIN)   Hair, Face Normal except as noted below in Assessment                                             ACNE VULGARIS ("COMMON ACNE"): FOLLOW UP    Physical Exam:   Psychiatric/Mood:   Anatomic Location Affected:  face   Morphological Description:  - Scattered red papules and pustules   Pertinent Positives:   Pertinent Negatives:     Additional History of Present Condition:     Previous Treatment Plan: tretinoin/Azelaic acid/hyaluronic acid   How compliant are you with the prescribed plan?:  Only using on pimple when present   Did you experience any side effects of treatment: denies    Are you happy with the improvement: no, leaving brown scar/cyst    Assessment and Plan:   We reviewed the causes of acne, the kinds of acne, and the expected clinical course   We discussed treatment options ranging from over-the-counter products, topical retinoids, antibiotics, BP, hormonal therapies (OCPs/spironolactone), and isotretinoin (Accutane)   We reviewed specific over-the-counter interventions and medications  Recommended typical hygiene measures including water-based facial products, washing regularly with mild cleanser, and refraining from picking and popping any pimples   Recommended non-comedogenic sunscreen use daily   Expectations of therapy discussed  Side effects, risks and benefits of medications discussed   A comprehensive handout on Acne was provided   The phone number to call in case of questions or concerns (and instructions to stop medications in such a scenario) was provided   After lengthy discussion of etiology and treatment options, we decided to implement the following personalized treatment plan:   Discussed Spirolactone 100 mg daily at night    Continue Compound Tretinoin Spread one pea-sized amount of medication over entire face about one hour before bedtime      Based on a thorough discussion of this condition and the management approach to it (including a comprehensive discussion of the known risks, side effects and potential benefits of treatment), the patient (family) agrees to implement the following specific plan:    --------------------------------------------------------------------------------------  YOUR PERSONALIZED ACNE ACTION PLAN    [address]    1) SKIN HYGEINE:  In the shower, wash your face, chest and back gently with Cetaphil moisturizing cleanser or Dove Fragrance-free bar  Do not use a luffa or washcloth as these tend to be too irritating to acne-prone skin  2) ANTIMICROBIAL BENZOYL PEROXIDE:   Neutrogena Clear Pore (Benzoyl Peroxide 3% wash): In the shower, apply this medication to your face, chest and back  Leave this wash on your skin for about 1 minutes and then rinse it off completely while in the shower  If you do not rinse it off completely, then it will bleach your towels or clothing  This medication is now available without prescription (over-the-counter) in most drug stores or at [employer] for about $7 a bottle  EVENING ROUTINE    1) SKIN HYGEINE:  In the shower, wash your face, chest and back gently with Cetaphil moisturizing cleanser or Dove Fragrance-free bar  Do not use a lufa or washcloth as these tend to be too irritating to acne-prone skin  Spironolactone 100 mg tablet daily        2) TOPICAL RETINOID:  At 1 hour before bedtime (after washing your face and allowing the skin to completely dry), spread only a single pea-sized amount of this medication evenly over your entire face (avoiding your eyes or mouth):   Tretinoin 0 05%/niacinamide 2%/azelaic acid 8%/hyuloranic acid 0 25% cream nightly    REMEMBER:  Always take your acne pills with lots of water! A pill stuck in your throat can cause significant burning and irritation  Drink a full glass of water to ensure the pill gets into your stomach  Avoid popping a pill right before bed, and stay upright for at least 1 hour after taking a pill  ACNE:  WHAT ZIT ALL ABOUT? WHY DO I HAVE ACNE/PIMPLES? Your skin is made of layers  To keep the skin from becoming dry and cracked, the skin needs oil  The oil is made in little wells in the deeper layers in the skin  People with acne have glands that make more oil and are more easily plugged, causing the glands to swell   Hormones, bacteria and your inherited tendency to have acne all play a role  The medical term for pimples is acne or acne vulgaris (vulgaris means common)  Most people get some acne  Acne does not come from being dirty  Instead, it is an expected consequence of changes that occur during normal growth and development  Hormones, bacteria, and your family's tendency to have acne may all play a role  Whiteheads or blackheads are openings of the glands (glands are the oil factories) onto the surface of the skin  Blackheads are not caused by dirt blocking the pores; instead, they result from the oxidation reaction of oil and skin in the pores with the air (like a rust reaction)  WHAT ABOUT STRESS? Stress does not cause acne but it can make it worse  Make sure you get enough sleep and daily exercise! WHAT ABOUT FOODS/DIET? Try to eat a balanced, healthy diet  Some people feel that certain foods worsen their acne  While there aren't many studies available on this question, severe dietary changes are unlikely to help your acne and may be harmful to the health of your skin  If you find that a certain food seems to aggravate your acne, you may consider avoiding that food  Discuss this with your physician! WHAT CAUSES MY ACNE? There are four contributors to acne--the body's natural oil (sebum), clogged pores, bacteria (with the scientific name Propionibacterium acnes, or P  acnes, for short), and the body's reaction to the bacteria living in the clogged pores (which causes inflammation)  Here's what happens:     Sebum is produced in the normal oil-making glands in the deeper layers of the skin and reaches the surface through the skin's pores  An increase in certain hormones occurs around the time of puberty, and these hormones trigger the oil glands to produce increased amounts of sebum   Pores with excess oil tend to become clogged more easily     At the same time, P  acnes--one of the many types of bacteria that normally live on everyone's skin--thrives in the excess oil and causes a skin reaction (inflammation)   If a pore is clogged close to the surface, there is little inflammation  However, this results in the formation of whiteheads (closed comedones) or blackheads (open comedones) at the surface of the skin   A plug that extends to, or forms a little deeper in the pore, or one that enlarges or ruptures may cause more inflammation  The result is red bumps (papules) and pus-filled pimples (pustules)   If plugging happens in the deepest skin layer, the inflammation may be even more severe, resulting in the formation of nodules or cysts  When these types of acne heal, they may leave behind discolored areas or true scars  SKIN HYGIENE:  HOW SHOULD I [address] MY SKIN? Acne does not come from being dirty, however, washing your face is part of taking good care of your skin and will help keep your face clear  Good skin hygiene is, therefore, critical to support any acne treatment plan  Here are several specific suggestions for practicing good skin hygiene and keeping your skin looking its best:     You should wash acne-prone skin TWICE A DAY: Once in the morning and once in the evening  This does include any showers you take that day, so do not overdo it!  Do not scrub the skin with a washcloth or loofah as these can irritate and inflame your acne  Acne does not come from dirt, so it is not necessary to scrub the skin clean  In fact, scrubbing may lead to dryness and irritation that makes the acne even worse and harder for patients to tolerate acne medications   Use a gentle facial moisturizing cleanser (Cetaphil Moisturizing Cleanser or Dove Fragrance-Free bar)  Avoid using soaps like [address] [facility], or soft/liquid soaps as these products will dry your skin   Do not use any over-the-counter acne washes without your doctor's specific instruction to do so    These products often contain salicylic acid or benzoyl peroxide  These ingredients can be helpful in clearing oil from the skin and reducing bacteria, but they may also be drying and can add to irritation   Do not use exfoliating products with microbeads or brushes as these can cause irritation to the skin   Facials and other treatments to remove, squeeze, or clean out pores are not recommended  Manipulating the skin in this way can make acne worse and can lead to severe infections and/or scarring  It also increases the likelihood that the skin will not be able to tolerate acne medications   Try not to pop pimples or pick at your acne as this can delay healing and may result in scarring or skin color changes (dark spots) that are often more noticeable than the acne itself  Picking/popping acne can also cause a serious skin infection   Wash or change your pillow case once to twice a week, especially if you use products in your hair   Wash the skin as soon as possible after playing sports or other activities that cause a lot of sweating  Also, pay attention to how your sports equipment (shoulder pads, helmet strap, etc ) might be making your acne worse   When you use makeup, moisturizer, or sunscreen make sure that these products are labeled non-comedogenic, or won't clog pores, or won't cause acne         SHOULD I TREAT MY ACNE? There are a number of other skin conditions that can look like acne  If there is any question about the diagnosis, then the person should be evaluated by a board certified pediatric and adolescent dermatologist   A physician should examine any child with acne who is between the ages of 3and 9years of age, as acne in this mid-childhood age group is not normal and may signal an underlying problem     If a preadolescent (9to 6years of age) or adolescent (15to 25years of age) has mild acne and the condition is not bothersome to the individual, proper and regular skin care (what your doctor may call skin hygiene) may be all that is needed at this point  Many people do, however, need specific acne medications to help their skin look and feel its best  Your doctor will tell you if you are one of these people  If so, you may be advised to use an over-the-counter or prescription medication that is applied to the skin (a topical medication) or if the addition of an oral medication (a medication taken by [employer]) is needed  The good news is that the medications work well when used properly! Some specific factors that may influence the choice of acne therapy include:     Severity  The number and type of skin lesions (papules or comedones) and the degree of inflammation (mild, moderate or severe)   Scarring  Scarring is most common when acne is severe, but it can happen even in children with mild acne   Impact  If a child is experiencing emotional complications because of the acne or is experiencing negative comments from other children   Cost of the acne medications  An acne expert can help to keep out of pocket costs to a minimum by utilizing the correct medications and the least expensive options   The patient's skin type (oily versus dry or combination skin, for example)   Potential side effects of the medication   The ease or overall complexity of the treatment plan or medication  WHAT ACNE TREATMENTS ARE AVAILABLE? Medications for acne try to stop the formation of new pimples by reducing or removing the oil, bacteria, and other things (like dead skin cells) that clog the pores  They can also decrease the inflammation or irritation response of the skin to bacteria  It may take from 6 to 8 weeks (about 2 months!) before you see any improvement and know if the medication is effective  It takes the layers of skin this long to regenerate  Remember, these medications do not cure the condition--the acne improves because of the medication   Therefore, treatment must be continued in order to prevent the return of acne lesions  There are many types of acne treatments  Some are applied to the skin (topical medications) and some are taken by mouth (oral medications)  In most cases of mild acne, the doctor will start with a topical medication  There are many different topical medications that are helpful for acne  If acne is more severe and it does not respond adequately to a topical medication, or if it covers large body surface areas such as the back and/or chest, oral antibiotics such as Doxycycline or Minocycline and/or oral hormone therapy such as Oral Contraceptive Pills or Spironolactone may be prescribed  In the most severe cases, isotretinoin (Accutane) may be used  In general, it is usually best to start with acne medications that are least likely to cause side effects but are at the same time capable of addressing the specific causes for the acne  Some patients have a good result with just one medication, but many will need to use a combination of treatments: two or more different topical agents or an oral medication plus a topical medication  Another treatment used for acne may include corticosteroid injections, which are used to help relieve pain, decrease the size, and encourage the healing of large, inflamed acne nodules  Also, dermatologists sometimes perform acne surgery, using a fine needle, a pointed blade, or an instrument known as a comedone extractor to mechanically clean out clogged pores  One must always weigh the risk for inducing a scar with the potential benefits of any procedure  Prior treatment with topical retinoids can loosen whiteheads and blackheads and make it easier to physically remove such lesions  Heat-based devices, and light and laser therapy are being studied to see whether there is any role for such treatments in mild to moderate acne   At this time, there is not enough evidence to make general recommendations about their use  TOPICAL ACNE MEDICATIONS    WHAT KIND OF TOPICALS ARE THERE?  Benzoyl peroxide (BP) helps to fight inflammation and is anti-microbial (kills bacteria, viruses, and other microorganisms) and is believed to help prevent resistance of bacteria to topical antibiotics  A benzoyl peroxide wash may be recommended for use on large areas such as the chest and/or back  Mild irritation and dryness are common when first using benzoyl peroxide-containing products  Be careful because benzoyl peroxide can bleach towels and clothing!  Retinoids (such as adapalene, tretinoin, or tazarotene) unplug the oil glands by helping peel away the layers of skin and other things plugging the opening of the glands  Mild irritation and dryness are common when first using these products  Facial waxing and other skin procedures can lead to excessive irritation and should be avoided during retinoid therapy   Antibiotics fight bacteria and help decrease inflammation  Topical antibiotics commonly used in acne include clindamycin, erythromycin, and combination agents (such as clindamycin/benzoyl peroxide or erythromycin/benzoyl peroxide)  Mild irritation and dryness are common when first using these products  Typically, topical antibiotics should not be used alone as treatment for acne   Other topical agents include salicylic acid, azelaic acid, dapsone, and sulfacetamide  Mild irritation and dryness can also occur when first using these products  USING YOUR TOPICAL TREATMENTS LIKE A PRO   Apply topical medications only to clean, dry skin  Topical medications may lead to significant dryness of the affected areas  To minimize this, wait 15-20 minutes after washing before applying your topical medication   These medications work deep in the skin to prevent new breakouts  Spot treatment of individual pimples does not do much  When applying topical medications to the face, use the 5-dot method   Start by placing a small pea-sized amount of the medication on your finger  Then, place dots in each of five locations of your face: Mid-forehead, each cheek, nose, and chin  Next, rub the medication into the entire area of skin - not just on individual pimples! Try to avoid the delicate skin around your eyes and corners of your mouth   The medications are not magic! They take weeks if not months to work  Be patient and use your medicine on a daily basis or as directed for six weeks before asking if your skin looks better  Try not to miss more than one or two days each week when using your medications   If you are starting a new medication, then try using it every other night or even every third night   Gradually work up to [employer] a day    This will give your skin time to adjust    The same medications often come in various forms or formulations: Creams, ointments, lotions, gels, microspheres, or foams  Use the formulation that has been recommended and don't switch to other forms unless instructed  Some forms (such as alcohol based gels) may be more drying and less tolerable for certain skin types   Sometimes individual medications are not as effective as a combination of two or more agents  The doctor may need to try several medications or combinations before finding the one that is best for that patient   Moisturizer, sunscreen, and make-up may be used in conjunction with topical acne medications  In general, acne medications are applied first so they may directly contact the skin  Ask your physician to review specific application instructions!  It is especially important to always use sunscreen when using a topical retinoid or oral antibiotic  These drugs can make your skin more sensitive to the sun  In general, sunscreen gets applied AFTER any acne medications   Don't stop using your acne medications just because your acne got better   Remember, the acne is better because of the medication, and prevention is the ferguson to treatment  HORMONAL THERAPY  Hormonal treatment is used only in females and usually consists of oral contraceptives (birth control pills)  Spironolactone is also sometimes used  HAVING PROBLEMS WITH ANY OF YOUR TREATMENTS? You should not be able to see any of the medicines on your face  If you can see a white film on your skin after you apply the medication, there is too much medicine in that area and you need to apply a thinner coat and make sure it is spread evenly on your face  If your skin gets too dry, you can apply a light (non-comedogenic) moisturizer on top of your medicine or you may switch to using the medicine every other day instead of every day  If your skin is still too irritated, you may need to switch to a milder medication  If your skin is red and very itchy, you may be allergic to the medication and you should stop using it  COMMON POSSIBLE SIDE EFFECTS OF MEDICATIONS     Retinoids - dryness, redness, increased sun sensitivity   Benzoyl peroxide - drying, redness, bleaching of clothes, towels and sheets, allergy  WHEN AND WHERE TO CALL WITH CONCERNS  We are here to help! If you experience any unusual symptoms, then stop taking or using the medication and call our office at [phone] (SKIN)  It is better to be safe than to be sorry!       Scribe Attestation    I,:  Jim Servin am acting as a scribe while in the presence of the attending physician :       I,:  Dionne Null MD personally performed the services described in this documentation    as scribed in my presence :

## 2022-09-29 NOTE — PATIENT INSTRUCTIONS
ACNE VULGARIS ("COMMON ACNE"): FOLLOW UP    Assessment and Plan: We reviewed the causes of acne, the kinds of acne, and the expected clinical course  We discussed treatment options ranging from over-the-counter products, topical retinoids, antibiotics, BP, hormonal therapies (OCPs/spironolactone), and isotretinoin (Accutane)  We reviewed specific over-the-counter interventions and medications  Recommended typical hygiene measures including water-based facial products, washing regularly with mild cleanser, and refraining from picking and popping any pimples  Recommended non-comedogenic sunscreen use daily  Expectations of therapy discussed  Side effects, risks and benefits of medications discussed  A comprehensive handout on Acne was provided  The phone number to call in case of questions or concerns (and instructions to stop medications in such a scenario) was provided  After lengthy discussion of etiology and treatment options, we decided to implement the following personalized treatment plan:  Discussed Spirolactone 100 mg daily at night   Continue Compound Tretinoin Spread one pea-sized amount of medication over entire face about one hour before bedtime  Based on a thorough discussion of this condition and the management approach to it (including a comprehensive discussion of the known risks, side effects and potential benefits of treatment), the patient (family) agrees to implement the following specific plan:    --------------------------------------------------------------------------------------  YOUR PERSONALIZED ACNE ACTION PLAN    [address] [place] [address] [address]kw[address]:  In the shower, wash your face, chest and back gently with Cetaphil moisturizing cleanser or Dove Fragrance-free bar  Do not use a luffa or washcloth as these tend to be too irritating to acne-prone skin  ANTIMICROBIAL BENZOYL PEROXIDE:  Neutrogena Clear Pore (Benzoyl Peroxide 3% wash):   In the shower, apply this medication to your face, chest and back  Leave this wash on your skin for about 1 minutes and then rinse it off completely while in the shower  If you do not rinse it off completely, then it will bleach your towels or clothing  This medication is now available without prescription (over-the-counter) in most drug stores or at [email or web address] for about $7 a bottle  EVENING ROUTINE    SKIN HYGEINE:  In the shower, wash your face, chest and back gently with Cetaphil moisturizing cleanser or Dove Fragrance-free bar  Do not use a lufa or washcloth as these tend to be too irritating to acne-prone skin  Spironolactone 100 mg tablet       TOPICAL RETINOID:  At 1 hour before bedtime (after washing your face and allowing the skin to completely dry), spread only a single pea-sized amount of this medication evenly over your entire face (avoiding your eyes or mouth):  Tretinoin 0 05%/niacinamide 2%/azelaic acid 8%/hyuloranic acid 0 25% cream nightly        REMEMBER:  Always take your acne pills with lots of water! A pill stuck in your throat can cause significant burning and irritation  Drink a full glass of water to ensure the pill gets into your stomach  Avoid popping a pill right before bed, and stay upright for at least 1 hour after taking a pill  ACNE:  WHAT ZIT ALL ABOUT? WHY DO I HAVE ACNE/PIMPLES? Your skin is made of layers  To keep the skin from becoming dry and cracked, the skin needs oil  The oil is made in little wells in the deeper layers in the skin  People with acne have glands that make more oil and are more easily plugged, causing the glands to swell  Hormones, bacteria and your inherited tendency to have acne all play a role  The medical term for pimples is acne or acne vulgaris (vulgaris means common)  Most people get some acne  Acne does not come from being dirty  Instead, it is an expected consequence of changes that occur during normal growth and development   Hormones, bacteria, and your family's tendency to have acne may all play a role  Whiteheads or blackheads are openings of the glands (glands are the oil factories) onto the surface of the skin  Blackheads are not caused by dirt blocking the pores; instead, they result from the oxidation reaction of oil and skin in the pores with the air (like a rust reaction)  WHAT ABOUT STRESS? Stress does not cause acne but it can make it worse  Make sure you get enough sleep and daily exercise! WHAT ABOUT FOODS/DIET? Try to eat a balanced, healthy diet  Some people feel that certain foods worsen their acne  While there aren't many studies available on this question, severe dietary changes are unlikely to help your acne and may be harmful to the health of your skin  If you find that a certain food seems to aggravate your acne, you may consider avoiding that food  Discuss this with your physician! WHAT CAUSES MY ACNE? There are four contributors to acne--the body's natural oil (sebum), clogged pores, bacteria (with the scientific name Propionibacterium acnes, or P  acnes, for short), and the body's reaction to the bacteria living in the clogged pores (which causes inflammation)  Here's what happens:    Sebum is produced in the normal oil-making glands in the deeper layers of the skin and reaches the surface through the skin's pores  An increase in certain hormones occurs around the time of puberty, and these hormones trigger the oil glands to produce increased amounts of sebum  Pores with excess oil tend to become clogged more easily  At the same time, P  acnes--one of the many types of bacteria that normally live on everyone's skin--thrives in the excess oil and causes a skin reaction (inflammation)  If a pore is clogged close to the surface, there is little inflammation  However, this results in the formation of whiteheads (closed comedones) or blackheads (open comedones) at the surface of the skin    A plug that extends to, or forms a little deeper in the pore, or one that enlarges or ruptures may cause more inflammation  The result is red bumps (papules) and pus-filled pimples (pustules)  If plugging happens in the deepest skin layer, the inflammation may be even more severe, resulting in the formation of nodules or cysts  When these types of acne heal, they may leave behind discolored areas or true scars  SKIN HYGIENE:  HOW SHOULD I [address] MY SKIN? Acne does not come from being dirty, however, washing your face is part of taking good care of your skin and will help keep your face clear  Good skin hygiene is, therefore, critical to support any acne treatment plan  Here are several specific suggestions for practicing good skin hygiene and keeping your skin looking its best:    You should wash acne-prone skin TWICE A DAY: Once in the morning and once in the evening  This does include any showers you take that day, so do not overdo it! Do not scrub the skin with a washcloth or loofah as these can irritate and inflame your acne  Acne does not come from dirt, so it is not necessary to scrub the skin clean  In fact, scrubbing may lead to dryness and irritation that makes the acne even worse and harder for patients to tolerate acne medications  Use a gentle facial moisturizing cleanser (Cetaphil Moisturizing Cleanser or Dove Fragrance-Free bar)  Avoid using soaps like Daniel Anand, Raymundo Garg [address], [facility], or soft/liquid soaps as these products will dry your skin  Do not use any over-the-counter acne washes without your doctor's specific instruction to do so  These products often contain salicylic acid or benzoyl peroxide  These ingredients can be helpful in clearing oil from the skin and reducing bacteria, but they may also be drying and can add to irritation  Do not use exfoliating products with microbeads or brushes as these can cause irritation to the skin     Facials and other treatments to remove, squeeze, or clean out pores are not recommended  Manipulating the skin in this way can make acne worse and can lead to severe infections and/or scarring  It also increases the likelihood that the skin will not be able to tolerate acne medications  Try not to pop pimples or pick at your acne as this can delay healing and may result in scarring or skin color changes (dark spots) that are often more noticeable than the acne itself  Picking/popping acne can also cause a serious skin infection  Wash or change your pillow case once to twice a week, especially if you use products in your hair  Wash the skin as soon as possible after playing sports or other activities that cause a lot of sweating  Also, pay attention to how your sports equipment (shoulder pads, helmet strap, etc ) might be making your acne worse  When you use makeup, moisturizer, or sunscreen make sure that these products are labeled non-comedogenic, or won't clog pores, or won't cause acne         SHOULD I TREAT MY ACNE? There are a number of other skin conditions that can look like acne  If there is any question about the diagnosis, then the person should be evaluated by a board certified pediatric and adolescent dermatologist   A physician should examine any child with acne who is between the ages of 3and 9years of age, as acne in this mid-childhood age group is not normal and may signal an underlying problem  If a preadolescent (9to 6years of age) or adolescent (15to 25years of age) has mild acne and the condition is not bothersome to the individual, proper and regular skin care (what your doctor may call skin hygiene) may be all that is needed at this point  Many people do, however, need specific acne medications to help their skin look and feel its best  Your doctor will tell you if you are one of these people   If so, you may be advised to use an over-the-counter or prescription medication that is applied to the skin (a topical medication) or if the addition of an oral medication (a medication taken by [employer]) is needed  The good news is that the medications work well when used properly! Some specific factors that may influence the choice of acne therapy include:    Severity  The number and type of skin lesions (papules or comedones) and the degree of inflammation (mild, moderate or severe)  Scarring  Scarring is most common when acne is severe, but it can happen even in children with mild acne  Impact  If a child is experiencing emotional complications because of the acne or is experiencing negative comments from other children  Cost of the acne medications  An acne expert can help to keep out of pocket costs to a minimum by utilizing the correct medications and the least expensive options  The patient's skin type (oily versus dry or combination skin, for example)  Potential side effects of the medication  The ease or overall complexity of the treatment plan or medication  WHAT ACNE TREATMENTS ARE AVAILABLE? Medications for acne try to stop the formation of new pimples by reducing or removing the oil, bacteria, and other things (like dead skin cells) that clog the pores  They can also decrease the inflammation or irritation response of the skin to bacteria  It may take from 6 to 8 weeks (about 2 months!) before you see any improvement and know if the medication is effective  It takes the layers of skin this long to regenerate  Remember, these medications do not cure the condition--the acne improves because of the medication  Therefore, treatment must be continued in order to prevent the return of acne lesions  There are many types of acne treatments  Some are applied to the skin (topical medications) and some are taken by mouth (oral medications)  In most cases of mild acne, the doctor will start with a topical medication  There are many different topical medications that are helpful for acne  If acne is more severe and it does not respond adequately to a topical medication, or if it covers large body surface areas such as the back and/or chest, oral antibiotics such as Doxycycline or Minocycline and/or oral hormone therapy such as Oral Contraceptive Pills or Spironolactone may be prescribed  In the most severe cases, isotretinoin (Accutane) may be used  In general, it is usually best to start with acne medications that are least likely to cause side effects but are at the same time capable of addressing the specific causes for the acne  Some patients have a good result with just one medication, but many will need to use a combination of treatments: two or more different topical agents or an oral medication plus a topical medication  Another treatment used for acne may include corticosteroid injections, which are used to help relieve pain, decrease the size, and encourage the healing of large, inflamed acne nodules  Also, dermatologists sometimes perform acne surgery, using a fine needle, a pointed blade, or an instrument known as a comedone extractor to mechanically clean out clogged pores  One must always weigh the risk for inducing a scar with the potential benefits of any procedure  Prior treatment with topical retinoids can loosen whiteheads and blackheads and make it easier to physically remove such lesions  Heat-based devices, and light and laser therapy are being studied to see whether there is any role for such treatments in mild to moderate acne  At this time, there is not enough evidence to make general recommendations about their use  TOPICAL ACNE MEDICATIONS    WHAT KIND OF TOPICALS ARE THERE? Benzoyl peroxide (BP) helps to fight inflammation and is anti-microbial (kills bacteria, viruses, and other microorganisms) and is believed to help prevent resistance of bacteria to topical antibiotics   A benzoyl peroxide wash may be recommended for use on large areas such as the chest and/or back  Mild irritation and dryness are common when first using benzoyl peroxide-containing products  Be careful because benzoyl peroxide can bleach towels and clothing! Retinoids (such as adapalene, tretinoin, or tazarotene) unplug the oil glands by helping peel away the layers of skin and other things plugging the opening of the glands  Mild irritation and dryness are common when first using these products  Facial waxing and other skin procedures can lead to excessive irritation and should be avoided during retinoid therapy  Antibiotics fight bacteria and help decrease inflammation  Topical antibiotics commonly used in acne include clindamycin, erythromycin, and combination agents (such as clindamycin/benzoyl peroxide or erythromycin/benzoyl peroxide)  Mild irritation and dryness are common when first using these products  Typically, topical antibiotics should not be used alone as treatment for acne  Other topical agents include salicylic acid, azelaic acid, dapsone, and sulfacetamide  Mild irritation and dryness can also occur when first using these products  USING YOUR TOPICAL TREATMENTS LIKE A PRO  Apply topical medications only to clean, dry skin  Topical medications may lead to significant dryness of the affected areas  To minimize this, wait 15-20 minutes after washing before applying your topical medication  These medications work deep in the skin to prevent new breakouts  Spot treatment of individual pimples does not do much  When applying topical medications to the face, use the 5-dot method  Start by placing a small pea-sized amount of the medication on your finger  Then, place dots in each of five locations of your face: Mid-forehead, each cheek, nose, and chin  Next, rub the medication into the entire area of skin - not just on individual pimples! Try to avoid the delicate skin around your eyes and corners of your mouth  The medications are not magic!   They take weeks if not months to work  Be patient and use your medicine on a daily basis or as directed for six weeks before asking if your skin looks better  Try not to miss more than one or two days each week when using your medications  If you are starting a new medication, then try using it every other night or even every third night   Gradually work up to [employer] a day    This will give your skin time to adjust   The same medications often come in various forms or formulations: Creams, ointments, lotions, gels, microspheres, or foams  Use the formulation that has been recommended and don't switch to other forms unless instructed  Some forms (such as alcohol based gels) may be more drying and less tolerable for certain skin types  Sometimes individual medications are not as effective as a combination of two or more agents  The doctor may need to try several medications or combinations before finding the one that is best for that patient  Moisturizer, sunscreen, and make-up may be used in conjunction with topical acne medications  In general, acne medications are applied first so they may directly contact the skin  Ask your physician to review specific application instructions! It is especially important to always use sunscreen when using a topical retinoid or oral antibiotic  These drugs can make your skin more sensitive to the sun  In general, sunscreen gets applied AFTER any acne medications  Don't stop using your acne medications just because your acne got better  Remember, the acne is better because of the medication, and prevention is the key to treatment  HORMONAL THERAPY  Hormonal treatment is used only in females and usually consists of oral contraceptives (birth control pills)  Spironolactone is also sometimes used  HAVING PROBLEMS WITH ANY OF YOUR TREATMENTS? You should not be able to see any of the medicines on your face   If you can see a white film on your skin after you apply the medication, there is too much medicine in that area and you need to apply a thinner coat and make sure it is spread evenly on your face  If your skin gets too dry, you can apply a light (non-comedogenic) moisturizer on top of your medicine or you may switch to using the medicine every other day instead of every day  If your skin is still too irritated, you may need to switch to a milder medication  If your skin is red and very itchy, you may be allergic to the medication and you should stop using it  COMMON POSSIBLE SIDE EFFECTS OF MEDICATIONS    Retinoids - dryness, redness, increased sun sensitivity  Benzoyl peroxide - drying, redness, bleaching of clothes, towels and sheets, allergy  WHEN AND WHERE TO CALL WITH CONCERNS  We are here to help! If you experience any unusual symptoms, then stop taking or using the medication and call our office at [phone] (SKIN)  It is better to be safe than to be sorry!

## 2022-12-05 ENCOUNTER — TELEPHONE (OUTPATIENT)
Dept: OBGYN CLINIC | Facility: CLINIC | Age: 37
End: 2022-12-05

## 2022-12-05 NOTE — TELEPHONE ENCOUNTER
Patient has been off her birth control and would like to restart them   Does patient need an appointment if not can it be explained how to start pills again

## 2022-12-05 NOTE — TELEPHONE ENCOUNTER
She has a year supply would give her enough until her annual visit in April  She has to wait for her next menses  She can start that day or wait for the Sunday later in the week    Condoms should be used the 1st cycle as a backup

## 2022-12-07 ENCOUNTER — HOSPITAL ENCOUNTER (OUTPATIENT)
Dept: ULTRASOUND IMAGING | Facility: HOSPITAL | Age: 37
Discharge: HOME | End: 2022-12-07
Attending: INTERNAL MEDICINE

## 2022-12-07 DIAGNOSIS — Z85.850 HX OF PAPILLARY THYROID CARCINOMA: ICD-10-CM

## 2022-12-27 ENCOUNTER — TELEPHONE (OUTPATIENT)
Dept: OBGYN CLINIC | Facility: CLINIC | Age: 37
End: 2022-12-27

## 2022-12-27 NOTE — TELEPHONE ENCOUNTER
Pt was sent msg on 12/5- did not read msg- replayed it to her when she can start per [facility] instructions

## 2023-02-08 ENCOUNTER — OFFICE VISIT (OUTPATIENT)
Dept: OBGYN CLINIC | Facility: CLINIC | Age: 38
End: 2023-02-08

## 2023-02-08 VITALS — WEIGHT: 115.6 LBS | SYSTOLIC BLOOD PRESSURE: 100 MMHG | BODY MASS INDEX: 20.81 KG/M2 | DIASTOLIC BLOOD PRESSURE: 76 MMHG

## 2023-02-08 DIAGNOSIS — N64.4 BREAST PAIN, LEFT: Primary | ICD-10-CM

## 2023-02-08 NOTE — PROGRESS NOTES
Assessment/Plan:  Left breast tenderness    Diagnostic left mammogram with breast ultrasound  Well at [facility] [employer] [facility]         Diagnoses and all orders for this visit:    Breast pain, left  -     Mammo diagnostic left w 3d & cad; Future  -     US breast left limited (diagnostic); Future              Subjective:      Patient ID: Shyanne Cohen is a 40 y o  female presents today for persistent left breast pain  She has had the pain on and off for the past year, and she denied any changes in frequency or severity of the pain  Pain is described as sharp and pinching, rated 3/10, occurs multiple times a day, and lasts <1 min  She does self breast exam every month, and she hasn't noticed any changes in the exam  She denied any breast skin change, nipple discharge, or abnormal lumps  There is no breast cancer in the family  She hasn't had any injuries to the breast to her knowledge  She is not on any new medications or herbal products  The following portions of the patient's history were reviewed and updated as appropriate: She  has no past medical history on file  Patient Active Problem List    Diagnosis Date Noted   • Vaginal moniliasis 06/13/2022   • Pelvic pain 06/12/2022   • Bloating 06/12/2022   • Menstrual migraine without status migrainosus, not intractable 04/04/2022   • Encounter for annual routine gynecological examination 04/03/2022   • Encounter for counseling regarding contraception 04/27/2020   • Non compliance w medication regimen 04/27/2020   • Vaginal discharge 04/27/2020   • Vulvar itching 04/27/2020   • Vulvar irritation 12/09/2019   • Encntr for gyn exam (general) (routine) w/o abn findings 04/24/2019   • Encounter for prescription of oral contraceptives 04/24/2019   • Postoperative hypothyroidism 01/29/2018   • Hx of papillary thyroid carcinoma 03/22/2016     She  has a past surgical history that includes Thyroid surgery    Her family history includes No Known Problems in her brother, brother, brother, daughter, father, maternal grandfather, maternal grandmother, mother, paternal grandfather, paternal grandmother, and son  She  reports that she has quit smoking  She uses smokeless tobacco  She reports that she does not currently use alcohol  She reports that she does not use drugs  Current Outpatient Medications   Medication Sig Dispense Refill   • drospirenone-ethinyl estradiol (BIANCA) 3-0 02 MG per tablet One tab p o  daily, discard the placebo pills and begin a new pack  You will be going through a pack every 3 we 28 tablet 17   • levothyroxine 25 mcg tablet Take 1 tablet (25 mcg total) by mouth daily 90 tablet 3   • tretinoin (RETIN-A) 0 025 % cream Apply pea sized amount topically to face one hour before bedtime  45 g 6     No current facility-administered medications for this visit  Current Outpatient Medications on File Prior to Visit   Medication Sig   • drospirenone-ethinyl estradiol (BIANCA) 3-0 02 MG per tablet One tab p o  daily, discard the placebo pills and begin a new pack  You will be going through a pack every 3 we   • levothyroxine 25 mcg tablet Take 1 tablet (25 mcg total) by mouth daily   • tretinoin (RETIN-A) 0 025 % cream Apply pea sized amount topically to face one hour before bedtime  • [DISCONTINUED] fluconazole (DIFLUCAN) 150 mg tablet    • [DISCONTINUED] fluticasone (FLONASE) 50 mcg/act nasal spray 2 sprays into each nostril daily (Patient not taking: No sig reported)   • [DISCONTINUED] spironolactone (ALDACTONE) 50 mg tablet Take 2 tablets (100 mg total) by mouth daily     No current facility-administered medications on file prior to visit  She is allergic to acetaminophen and ciprofloxacin       Review of Systems   Constitutional: Negative for chills, fever and unexpected weight change  HENT: Negative for ear pain and sore throat  Eyes: Negative for pain and visual disturbance  Respiratory: Negative for cough and shortness of breath      Cardiovascular: Negative for chest pain and palpitations  Gastrointestinal: Positive for constipation  Negative for abdominal pain and vomiting  Genitourinary: Negative for dysuria and hematuria  Musculoskeletal: Negative for arthralgias and back pain  Skin: Negative for color change and rash  Neurological: Positive for headaches (migraines with mensturation)  Negative for dizziness, seizures, syncope and light-headedness  All other systems reviewed and are negative  Objective:    Vitals:    02/08/23 1052   BP: 100/76   BP Location: Right arm   Patient Position: Sitting   Cuff Size: Standard   Weight: 52 4 kg (115 lb 9 6 oz)            Physical Exam  Constitutional:       General: She is not in acute distress  Appearance: Normal appearance  HENT:      Head: Normocephalic and atraumatic  Cardiovascular:      Rate and Rhythm: Normal rate and regular rhythm  Pulses: Normal pulses  Heart sounds: Normal heart sounds  No murmur heard  Chest:   Breasts:     Right: Normal  No swelling, bleeding, inverted nipple, mass, nipple discharge, skin change or tenderness  Left: Tenderness present  No swelling, bleeding, inverted nipple, mass, nipple discharge or skin change  Comments: Fibrocystic changes within both breasts  The tenderness is below the left areola  Abdominal:      General: Bowel sounds are normal       Palpations: Abdomen is soft  Skin:     General: Skin is warm and dry  Neurological:      General: No focal deficit present  Mental Status: She is alert and oriented to person, place, and time  Psychiatric:         Mood and Affect: Mood normal          Behavior: Behavior normal          Thought Content:  Thought content normal

## 2023-04-12 PROBLEM — Z30.41 SURVEILLANCE FOR BIRTH CONTROL, ORAL CONTRACEPTIVES: Status: ACTIVE | Noted: 2023-04-12

## 2023-04-26 ENCOUNTER — TELEPHONE (OUTPATIENT)
Dept: ADMINISTRATIVE | Facility: OTHER | Age: 38
End: 2023-04-26

## 2023-04-26 NOTE — TELEPHONE ENCOUNTER
Upon review of the In Basket request we were able to locate, review, and update the patient chart as requested for HIV  Any additional questions or concerns should be emailed to the Practice Liaisons via the appropriate education email address, please do not reply via In Basket      Thank you  Marshal Lemus MA

## 2023-04-26 NOTE — TELEPHONE ENCOUNTER
----- Message from Sanam Calvin MA sent at 4/26/2023 10:12 AM EDT -----  Regarding: CARE GAP REQUEST  04/26/23 10:13 AM    Hello, our patient Shirley Lambert has had HIV completed/performed  Please assist in updating the patient chart by pulling the Care Everywhere (CE) document  The date of service is 6/29/2007       Thank you,  Sanam Calvin   EDUAR

## 2023-05-03 ENCOUNTER — HOSPITAL ENCOUNTER (OUTPATIENT)
Dept: MAMMOGRAPHY | Facility: CLINIC | Age: 38
Discharge: HOME | End: 2023-05-03

## 2023-05-03 ENCOUNTER — HOSPITAL ENCOUNTER (OUTPATIENT)
Dept: ULTRASOUND IMAGING | Facility: CLINIC | Age: 38
Discharge: HOME | End: 2023-05-03

## 2023-05-03 VITALS — BODY MASS INDEX: 20.8 KG/M2 | WEIGHT: 113 LBS | HEIGHT: 62 IN

## 2023-05-03 DIAGNOSIS — N64.4 BREAST PAIN, LEFT: ICD-10-CM

## 2023-05-03 NOTE — RESULT ENCOUNTER NOTE
Radiologist has recommended a biopsy of the left breast using ultrasound guidance  Should have been explained already  Order was placed

## 2023-05-03 NOTE — PROGRESS NOTES
Met with patient and Dr Still Her  regarding recommendation for;    _____ RIGHT ___x___LEFT      __x___Ultrasound guided  ______Stereotactic breast biopsy  __X___Verbalized understanding        Blood thinners:  No: __x___ Yes: ______ What:                 Biopsy teaching sheet given:  Yes: ___X___ No: ________    Pt given contact information and adv to call with any questions/needs

## 2023-05-04 ENCOUNTER — TELEPHONE (OUTPATIENT)
Dept: OBGYN CLINIC | Facility: CLINIC | Age: 38
End: 2023-05-04

## 2023-05-04 NOTE — TELEPHONE ENCOUNTER
----- Message from Renee Nguyen MD sent at 5/3/2023  4:55 PM EDT -----  Radiologist has recommended a biopsy of the left breast using ultrasound guidance  Should have been explained already  Order was placed

## 2023-05-04 NOTE — TELEPHONE ENCOUNTER
Spoke with patient, verbalized understanding  She did not feel confident with results and wanted a second opinion  Explained this may come with a cost and that she needs to check her insurance about going to a out of network radiologist  She said she may just want another [facility] radiologist to look at it, she had the test preformed at the  location  Offered her the [employer] phone number to call and inquire if someone else could possibly look at her images and give their recommendation   Patient was driving offered to send information to [facility] E Anish maggie

## 2023-05-10 ENCOUNTER — HOSPITAL ENCOUNTER (OUTPATIENT)
Dept: ULTRASOUND IMAGING | Facility: CLINIC | Age: 38
Discharge: HOME | End: 2023-05-10

## 2023-05-10 ENCOUNTER — HOSPITAL ENCOUNTER (OUTPATIENT)
Dept: MAMMOGRAPHY | Facility: CLINIC | Age: 38
Discharge: HOME | End: 2023-05-10

## 2023-05-10 VITALS — DIASTOLIC BLOOD PRESSURE: 69 MMHG | SYSTOLIC BLOOD PRESSURE: 103 MMHG | HEART RATE: 82 BPM

## 2023-05-10 DIAGNOSIS — R92.8 ABNORMAL MAMMOGRAM: ICD-10-CM

## 2023-05-10 RX ORDER — LIDOCAINE HYDROCHLORIDE 10 MG/ML
5 INJECTION, SOLUTION EPIDURAL; INFILTRATION; INTRACAUDAL; PERINEURAL ONCE
Status: COMPLETED | OUTPATIENT
Start: 2023-05-10 | End: 2023-05-10

## 2023-05-10 RX ADMIN — LIDOCAINE HYDROCHLORIDE 5 ML: 10 INJECTION, SOLUTION EPIDURAL; INFILTRATION; INTRACAUDAL; PERINEURAL at 08:10

## 2023-05-10 NOTE — PROGRESS NOTES
Ice pack given:    __X___yes _____no    Discharge instructions given to patient:    __X___yes _____no    Discharged via:    __X___amulatory    _____wheelchair    _____stretcher    Stable on discharge:    __X___yes ____no    Patient would like results over the phone  Ok to leave message over the phone

## 2023-05-10 NOTE — PROGRESS NOTES
Procedure type:    ___X__ultrasound guided _____stereotactic    Breast:    __X___Left _____Right    Location:3:00, 5 cmfn     Needle:12 gauge with introducer chu     # of passes:3    Clip: Wing     Performed by:Dr Neal Truong     Pressure held for 5 minutes by:Ludmila Laws Strips:    ___X__yes _____no    Band aid:    __X___yes_____no    Tolerated procedure:    __X___yes _____no

## 2023-05-11 NOTE — PROGRESS NOTES
Post procedure call completed    Bleeding: _____yes __X___no (pt denies)    Pain: _____yes ___X___no (pt with tenderness, using ice, taking Advil with some relief)    Redness/Swelling: ______yes ___X___no (pt denies)    Band aid removed: _____yes ___X__no (discussed removing when she showers)    Steri-Strips intact: ___X___yes _____no (discussed with patient to remove steri strips on Monday if they have not come off on their own)    Pt with no questions at this time, adv will call when results available, adv to call with any questions or concerns, has name/# for contact

## 2023-05-12 ENCOUNTER — TELEPHONE (OUTPATIENT)
Dept: MAMMOGRAPHY | Facility: CLINIC | Age: 38
End: 2023-05-12

## 2023-06-13 ENCOUNTER — HOSPITAL ENCOUNTER (OUTPATIENT)
Dept: ULTRASOUND IMAGING | Facility: CLINIC | Age: 38
Discharge: HOME | End: 2023-06-13

## 2023-09-06 ENCOUNTER — APPOINTMENT (OUTPATIENT)
Dept: LAB | Facility: HOSPITAL | Age: 38
End: 2023-09-06
Payer: COMMERCIAL

## 2023-09-06 DIAGNOSIS — Z85.850 HX OF PAPILLARY THYROID CARCINOMA: Primary | ICD-10-CM

## 2023-09-06 LAB
T4 FREE SERPL-MCNC: 0.79 NG/DL (ref 0.61–1.12)
TSH SERPL DL<=0.05 MIU/L-ACNC: 0.61 UIU/ML (ref 0.45–4.5)

## 2023-09-06 PROCEDURE — 36415 COLL VENOUS BLD VENIPUNCTURE: CPT

## 2023-09-06 PROCEDURE — 84439 ASSAY OF FREE THYROXINE: CPT

## 2023-09-06 PROCEDURE — 84443 ASSAY THYROID STIM HORMONE: CPT

## 2023-09-07 ENCOUNTER — OFFICE VISIT (OUTPATIENT)
Dept: ENDOCRINOLOGY | Facility: CLINIC | Age: 38
End: 2023-09-07
Payer: COMMERCIAL

## 2023-09-07 VITALS
SYSTOLIC BLOOD PRESSURE: 104 MMHG | WEIGHT: 112.8 LBS | BODY MASS INDEX: 20.76 KG/M2 | OXYGEN SATURATION: 98 % | HEIGHT: 62 IN | HEART RATE: 80 BPM | DIASTOLIC BLOOD PRESSURE: 78 MMHG

## 2023-09-07 DIAGNOSIS — E89.0 POSTOPERATIVE HYPOTHYROIDISM: ICD-10-CM

## 2023-09-07 DIAGNOSIS — Z85.850 HX OF PAPILLARY THYROID CARCINOMA: Primary | ICD-10-CM

## 2023-09-07 PROCEDURE — 99214 OFFICE O/P EST MOD 30 MIN: CPT | Performed by: PHYSICIAN ASSISTANT

## 2023-09-07 RX ORDER — LEVOTHYROXINE SODIUM 0.03 MG/1
25 TABLET ORAL DAILY
Qty: 90 TABLET | Refills: 3 | Status: SHIPPED | OUTPATIENT
Start: 2023-09-07

## 2023-09-07 NOTE — ASSESSMENT & PLAN NOTE
NO evidence of recurrence. Will not monitor thyroglobulin as she has had a hemithyroidectomy. Discussed low risk of recurrence and deferring yearly ultrasound, however she feels more comfortable continuing to have yearly ultrasound at this time.

## 2023-09-07 NOTE — PROGRESS NOTES
Established Patient Progress Note       Chief Complaint   Patient presents with   • Thyroid Cancer   • Hypothyroidism        Impression & Plan:    Problem List Items Addressed This Visit        Endocrine    Postoperative hypothyroidism     Continue Levothyroxine. TSH at goal in low normal. Range. Relevant Medications    levothyroxine 25 mcg tablet    Other Relevant Orders    TSH, 3rd generation    T4, free       Other    Hx of papillary thyroid carcinoma - Primary     NO evidence of recurrence. Will not monitor thyroglobulin as she has had a hemithyroidectomy. Discussed low risk of recurrence and deferring yearly ultrasound, however she feels more comfortable continuing to have yearly ultrasound at this time. Relevant Orders    US head neck lymph node mapping    TSH, 3rd generation    T4, free       Orders Placed This Encounter   Procedures   • US head neck lymph node mapping     Please include residual right thyroid along with lymph node mapping. (hx hemithyroidectomy)     Standing Status:   Future     Standing Expiration Date:   9/7/2027     Scheduling Instructions:      No prep required. Please bring your insurance cards, a form of photo ID and a list of your medications with you. Arrive 15 minutes prior to your appointment time in order to register. To schedule this appointment, please contact Central Scheduling at [phone]. Order Specific Question:   When should the test be performed? Answer:   in 3 months   • TSH, 3rd generation     This is a patient instruction: This test is non-fasting. Please drink two glasses of water morning of bloodwork.         Standing Status:   Future     Standing Expiration Date:   3/7/2025   • T4, free     Standing Status:   Future     Standing Expiration Date:   3/7/2025       History of Present Illness:     Kyle Izquierdo is a 40 y.o. female with a history of papillary thyroid CA and postoperative hypothyroidism.      She had a left herbie thyroidectomy in March 2016 for a 1.2 cm papillary thyroid CA. She did not have a completion thyroidectomy or radioactive iodine therapy. Ultrasound 12/2022 showed no recurrent disease. Right sided thyroid ultrasound did not show any nodules. For the hypothyroidism, She is taking levothyroxine 25mcg daily. She does report often feeling cold but otherwise feels well with no symptoms of hypo/hyperthyroidism. She is on OCP with no plans for pregnancy. Patient Active Problem List   Diagnosis   • Encntr for gyn exam (general) (routine) w/o abn findings   • Encounter for prescription of oral contraceptives   • Vulvar irritation   • Encounter for counseling regarding contraception   • Non compliance w medication regimen   • Vaginal discharge   • Vulvar itching   • Postoperative hypothyroidism   • Hx of papillary thyroid carcinoma   • Encounter for annual routine gynecological examination   • Menstrual migraine without status migrainosus, not intractable   • Pelvic pain   • Bloating   • Vaginal moniliasis   • Surveillance for birth control, oral contraceptives      History reviewed. No pertinent past medical history.    Past Surgical History:   Procedure Laterality Date   • THYROID SURGERY     • US GUIDED BREAST BIOPSY LEFT COMPLETE Left 5/10/2023   • [address] THYROID BIOPSY  1/27/2016      Family History   Problem Relation Age of Onset   • No Known Problems Mother    • No Known Problems Father    • No Known Problems Daughter    • No Known Problems Maternal Grandmother    • No Known Problems Maternal Grandfather    • No Known Problems Paternal Grandmother    • No Known Problems Paternal Grandfather    • No Known Problems Brother    • No Known Problems Brother    • No Known Problems Brother    • No Known Problems Son    • No Known Problems Maternal Aunt    • No Known Problems Maternal Aunt    • No Known Problems Paternal Aunt    • No Known Problems Maternal Uncle    • No Known Problems Maternal Uncle    • No Known Problems Maternal Uncle    • No Known Problems Paternal Uncle    • No Known Problems Paternal Uncle    • No Known Problems Paternal Uncle    • Breast cancer Neg Hx    • Colon cancer Neg Hx    • Endometrial cancer Neg Hx    • Ovarian cancer Neg Hx      Social History     Tobacco Use   • Smoking status: Former   • Smokeless tobacco: Current   • Tobacco comments:     hooka daily in the summer, winter once per week   Substance Use Topics   • Alcohol use: Not Currently     Allergies   Allergen Reactions   • Acetaminophen Hives   • Ciprofloxacin Hives     To be further evaluated by allergy specialist per Dr. Asim Jones       Current Outpatient Medications:   •  drospirenone-ethinyl estradiol (BIANCA) 3-0.02 MG per tablet, One tab p.o. daily, discard the placebo pills and begin a new pack. You will be going through a pack every 3 we, Disp: 28 tablet, Rfl: 17  •  levothyroxine 25 mcg tablet, Take 1 tablet (25 mcg total) by mouth daily, Disp: 90 tablet, Rfl: 3  •  nystatin-triamcinolone (MYCOLOG-II) cream, Apply topically 2 (two) times a day, Disp: 30 g, Rfl: 2    Review of Systems   Constitutional: Negative for activity change, appetite change, chills, diaphoresis, fatigue, fever and unexpected weight change. HENT: Negative for trouble swallowing and voice change. Eyes: Negative for visual disturbance. Respiratory: Negative for shortness of breath. Cardiovascular: Negative for chest pain and palpitations. Gastrointestinal: Negative for abdominal pain, constipation and diarrhea. Endocrine: Positive for cold intolerance. Negative for heat intolerance, polydipsia, polyphagia and polyuria. Genitourinary: Negative for frequency and menstrual problem. Musculoskeletal: Negative for arthralgias and myalgias. Skin: Negative for rash. Allergic/Immunologic: Negative for food allergies. Neurological: Negative for dizziness and tremors. Hematological: Negative for adenopathy.    Psychiatric/Behavioral: Negative for sleep disturbance. All other systems reviewed and are negative. Physical Exam:  Body mass index is 20.63 kg/m². /78 (BP Location: Right arm, Patient Position: Sitting, Cuff Size: Adult)   Pulse 80   Ht 5' 2" (1.575 m)   Wt 51.2 kg (112 lb 12.8 oz)   SpO2 98%   BMI 20.63 kg/m²    Wt Readings from Last 3 Encounters:   09/07/23 51.2 kg (112 lb 12.8 oz)   05/03/23 51.3 kg (113 lb)   04/17/23 51.3 kg (113 lb)       Physical Exam  Vitals reviewed. Constitutional:       General: She is not in acute distress. Appearance: She is well-developed. HENT:      Head: Normocephalic and atraumatic. Eyes:      Conjunctiva/sclera: Conjunctivae normal.      Pupils: Pupils are equal, round, and reactive to light. Neck:      Thyroid: No thyromegaly. Cardiovascular:      Rate and Rhythm: Normal rate and regular rhythm. Heart sounds: Normal heart sounds. Pulmonary:      Effort: Pulmonary effort is normal. No respiratory distress. Breath sounds: Normal breath sounds. No wheezing or rales. Abdominal:      General: Bowel sounds are normal. There is no distension. Palpations: Abdomen is soft. Tenderness: There is no abdominal tenderness. Musculoskeletal:         General: Normal range of motion. Cervical back: Normal range of motion and neck supple. Skin:     General: Skin is warm and dry. Neurological:      Mental Status: She is alert and oriented to person, place, and time. Labs:     papillary thyroid CA and postoperative hypothyroidism.      She had a left herbie thyroidectomy in March 2016 for a 1.2 cm papillary thyroid CA. She did not have a completion thyroidectomy or radioactive iodine therapy. Since then, she has had postoperative hypothyroidism for which she is on levothyroxine 25 mcg per day. She denies any symptoms of hypo or hyperthyroidism. She denies any family history of thyroid cancer or personal history of radiation exposure.   There are no Patient Instructions on file for this visit. Discussed with the patient and all questioned fully answered. She will call me if any problems arise. Follow-up appointment in 12 months.      Counseled patient on diagnostic results, prognosis, risk and benefit of treatment options, instruction for management, importance of treatment compliance, Risk  factor reduction and impressions    Kannan Gongora PA-C

## 2023-11-10 ENCOUNTER — HOSPITAL ENCOUNTER (OUTPATIENT)
Dept: ULTRASOUND IMAGING | Facility: CLINIC | Age: 38
End: 2023-11-10
Payer: COMMERCIAL

## 2023-11-10 DIAGNOSIS — R92.8 ABNORMAL MAMMOGRAM: ICD-10-CM

## 2023-11-10 PROCEDURE — 76642 ULTRASOUND BREAST LIMITED: CPT

## 2023-11-16 ENCOUNTER — HOSPITAL ENCOUNTER (OUTPATIENT)
Dept: ULTRASOUND IMAGING | Facility: HOSPITAL | Age: 38
Discharge: HOME | End: 2023-11-16
Payer: COMMERCIAL

## 2023-11-16 DIAGNOSIS — Z85.850 HX OF PAPILLARY THYROID CARCINOMA: ICD-10-CM

## 2023-11-16 PROCEDURE — 76536 US EXAM OF HEAD AND NECK: CPT

## 2023-11-22 ENCOUNTER — TELEPHONE (OUTPATIENT)
Dept: ENDOCRINOLOGY | Facility: CLINIC | Age: 38
End: 2023-11-22

## 2023-11-22 NOTE — TELEPHONE ENCOUNTER
----- Message from Paola Mckeon PA-C sent at 11/22/2023 12:53 PM EST -----  Ultrasound is stable and there are no concerning lymph nodes.

## 2024-02-21 PROBLEM — Z01.419 ENCOUNTER FOR ANNUAL ROUTINE GYNECOLOGICAL EXAMINATION: Status: RESOLVED | Noted: 2022-04-03 | Resolved: 2024-02-21

## 2024-02-21 PROBLEM — Z01.419 ENCNTR FOR GYN EXAM (GENERAL) (ROUTINE) W/O ABN FINDINGS: Status: RESOLVED | Noted: 2019-04-24 | Resolved: 2024-02-21

## 2024-05-31 ENCOUNTER — TELEPHONE (OUTPATIENT)
Dept: FAMILY MEDICINE CLINIC | Facility: CLINIC | Age: 39
End: 2024-05-31

## 2024-05-31 NOTE — TELEPHONE ENCOUNTER
05/31/24 3:59 PM        The office's request has been received, reviewed, and the patient chart updated. The PCP has successfully been removed with a patient attribution note. This message will now be completed.        Thank you  Jose Alejandro Slade

## 2024-05-31 NOTE — TELEPHONE ENCOUNTER
Patient due for Annual Physical. Last physical on 1/7/2020  .Reminder call made today 05/31/24 and per patient she don't have insurance and will not be scheduling an appointment in our office.

## 2024-07-15 DIAGNOSIS — G43.829 MENSTRUAL MIGRAINE WITHOUT STATUS MIGRAINOSUS, NOT INTRACTABLE: ICD-10-CM

## 2024-07-15 DIAGNOSIS — Z30.011 ENCOUNTER FOR PRESCRIPTION OF ORAL CONTRACEPTIVES: ICD-10-CM

## 2024-07-15 DIAGNOSIS — Z01.419 ENCOUNTER FOR ANNUAL ROUTINE GYNECOLOGICAL EXAMINATION: ICD-10-CM

## 2024-07-15 RX ORDER — DROSPIRENONE AND ETHINYL ESTRADIOL 0.02-3(28)
KIT ORAL
Qty: 28 TABLET | Refills: 0 | Status: SHIPPED | OUTPATIENT
Start: 2024-07-15

## 2024-07-15 NOTE — TELEPHONE ENCOUNTER
Spoke with Sabine, she no longer has insurance and will be attempting to schedule with [facility]. Olive had provided her with that information via My Chart.

## 2024-07-15 NOTE — TELEPHONE ENCOUNTER
Patient is asking the office to call her back to discuss directions of medication she has questions about the directions. She is also completely out and has taken the last pill today. Routed to office due to patients question       Reason for call:   [x] Refill   [] Prior Auth  [] Other:     Office:   [] PCP/Provider -   [x] Specialty/Provider -  Rick Hart MD-[facility]      Medication: drospirenone-ethinyl estradiol (BIANCA) 3-0.02 MG per tablet       One tab p.o. daily, discard the placebo pills and begin a new pack. You will be going through a pack every 3 we       Pharmacy: : [facility]  [place] - [address] [place] [address],     Does the patient have enough for 3 days?   [] Yes   [x] No - Send as HP to POD

## 2024-07-15 NOTE — TELEPHONE ENCOUNTER
Dr. Frank-  Dr. Hart out. Can you refill RX. I called the patient and she lost her insurance. She is willing to go to [facility] but needs a refill until she gets an appointment. I sent the patient information on [employer] so she can schedule. She was supposed to take her pill yesterday. Can send a courtesy refill over? If so, how should she start it since she missed yesterday? 2 pills today then start the pack regularly? Please advise to [facility].

## 2024-09-10 ENCOUNTER — OFFICE VISIT (OUTPATIENT)
Dept: OBGYN CLINIC | Facility: CLINIC | Age: 39
End: 2024-09-10

## 2024-09-10 VITALS
HEART RATE: 84 BPM | SYSTOLIC BLOOD PRESSURE: 121 MMHG | DIASTOLIC BLOOD PRESSURE: 74 MMHG | HEIGHT: 61 IN | RESPIRATION RATE: 18 BRPM | BODY MASS INDEX: 22.51 KG/M2 | WEIGHT: 119.2 LBS

## 2024-09-10 DIAGNOSIS — Z01.419 ENCOUNTER FOR ANNUAL ROUTINE GYNECOLOGICAL EXAMINATION: Primary | ICD-10-CM

## 2024-09-10 DIAGNOSIS — L29.2 VULVAR ITCHING: ICD-10-CM

## 2024-09-10 DIAGNOSIS — Z59.89 DOES NOT HAVE HEALTH INSURANCE: ICD-10-CM

## 2024-09-10 DIAGNOSIS — Z30.011 ENCOUNTER FOR PRESCRIPTION OF ORAL CONTRACEPTIVES: ICD-10-CM

## 2024-09-10 DIAGNOSIS — G43.829 MENSTRUAL MIGRAINE WITHOUT STATUS MIGRAINOSUS, NOT INTRACTABLE: ICD-10-CM

## 2024-09-10 PROCEDURE — 99385 PREV VISIT NEW AGE 18-39: CPT | Performed by: OBSTETRICS & GYNECOLOGY

## 2024-09-10 PROCEDURE — G0476 HPV COMBO ASSAY CA SCREEN: HCPCS

## 2024-09-10 PROCEDURE — G0145 SCR C/V CYTO,THINLAYER,RESCR: HCPCS

## 2024-09-10 RX ORDER — NYSTATIN AND TRIAMCINOLONE ACETONIDE 100000; 1 [USP'U]/G; MG/G
CREAM TOPICAL 2 TIMES DAILY
Qty: 30 G | Refills: 2 | Status: SHIPPED | OUTPATIENT
Start: 2024-09-10

## 2024-09-10 RX ORDER — DROSPIRENONE AND ETHINYL ESTRADIOL 0.02-3(28)
KIT ORAL
Qty: 28 TABLET | Refills: 11 | Status: SHIPPED | OUTPATIENT
Start: 2024-09-10

## 2024-09-10 SDOH — ECONOMIC STABILITY - INCOME SECURITY: OTHER PROBLEMS RELATED TO HOUSING AND ECONOMIC CIRCUMSTANCES: Z59.89

## 2024-09-10 NOTE — ASSESSMENT & PLAN NOTE
This healthy 38-year-old female presenting for annual gynecologic exam.  STD testing declined today as patient does not have insurance.  Pap last collected 4/24/2019 and was negative for HPV and normal cytology.  Patient does endorse some dyspareunia.    Plan:  Advised patient to utilize lubricant for sex  Continue oral contraceptive pills  Continue Mycolog cream as needed  Pap collected today, will contact patient with results  Patient has ultrasound ordered every 6 months due to known fibroadenoma  Social work referral to help gain insurance prior to obtaining breast ultrasound

## 2024-09-10 NOTE — PROGRESS NOTES
"OB/GYN ANNUAL H&P    SUBJECTIVE:    Sabine Love is a 38 y.o.  female who presents for annual well woman H&P. Severe migraines week before and week after periods.     GYN:  Periods are regular, occur every 28 to 30 days, last 2-3 days  Gooey brown discharge on days of placebo pills  Gets headaches week before placebo pills and week after  Abnormal vaginal discharge: no  Genital lesions: no  Genital irritation or itching: no  Sexually active:  with   Dyspareunia: sometimes, does not use lubricant during sex  Uses nystatin-triamcinolone cream for itching/burning and swelling after sex  Contraception: ocps (alma)  STI history: no  Gynecologic surgical history: none    OB:   female  Prior pregnancies uncomplicated    :  Dysuria: no  Hematuria: no  Urgency: no  Frequency: no  Urinary incontinence: no    Breast:  Mass: no  Skin changes: no  Reddening: no  Dimpling: no  Pain: left breast pain and pinching, supposed to go to ultrasound every 6 months but lost insurance  Nipple discharge: no    General:  Diet: balanced diet  Exercise: inconsistent home workouts  Work:   Alcohol use: no  Tobacco use: occasionally hookah twice weekly  Recreational drug use: no    Family history:  Breast cancer: no  Endometrial cancer: no  Ovarian cancer: no    Screening:  Cervical cancer  Last pap smear on 19 showed negative for HPV normal cytology  Breast cancer:  Last mammogram on  showed 8 mm density in left breast, biopsy completed showing fibroadenoma  Colon cancer:  Due for first routine screening colonoscopy at 45 yo  STI screening: declined GC/CT, RPR, HIV, Hep B, Hep C    Immunizations:  COVID: 22 and 22  Flu: no, declined  Gardasil: no    Review of Systems:  Pertinent items are noted in HPI.    OBJECTIVE:    Vitals:   /74 (BP Location: Right arm, Patient Position: Sitting, Cuff Size: Adult)   Pulse 84   Resp 18   Ht 5' 1.25\" (1.556 m)   Wt 54.1 kg (119 lb 3.2 oz)   LMP " 2024 (Exact Date)   BMI 22.34 kg/m²       Physical Exam  Constitutional:       Appearance: Normal appearance.   Genitourinary:      No lesions in the vagina.      No labial fusion noted.      No vaginal discharge, tenderness, bleeding or ulceration.        Right Adnexa: not tender, not full and no mass present.     Left Adnexa: not tender, not full and no mass present.  Cardiovascular:      Rate and Rhythm: Normal rate and regular rhythm.   Pulmonary:      Effort: Pulmonary effort is normal.      Breath sounds: Normal breath sounds.   Abdominal:      General: Abdomen is flat. Bowel sounds are normal.      Palpations: Abdomen is soft.   Neurological:      Mental Status: She is alert.   Skin:     General: Skin is warm and dry.                  ASSESSMENT:    Sabine Love is a 38 y.o.  here for annual physical     1. Encounter for annual routine gynecological examination  Assessment & Plan:  This healthy 38-year-old female presenting for annual gynecologic exam.  STD testing declined today as patient does not have insurance.  Pap last collected 2019 and was negative for HPV and normal cytology.  Patient does endorse some dyspareunia.    Plan:  Advised patient to utilize lubricant for sex  Continue oral contraceptive pills  Continue Mycolog cream as needed  Pap collected today, will contact patient with results  Patient has ultrasound ordered every 6 months due to known fibroadenoma  Social work referral to help gain insurance prior to obtaining breast ultrasound  Orders:  -     drospirenone-ethinyl estradiol (BIANCA) 3-0.02 MG per tablet; One tab p.o. daily, discard the placebo pills and begin a new pack.  You will be going through a pack every 3 we  -     Liquid-based pap, screening  2. Menstrual migraine without status migrainosus, not intractable  Assessment & Plan:  Patient has history of menstrual migraines reports migraine week prior and week following her menses.  Patient prescribed Bianca birth  control pill and instructed that she may take pack continuously skipping placebo pills.  Patient reports she was unsure about this and has continued to take placebo pills.  Advised patient to skip placebo pills for 90 days and then take placebo pills.  This will decrease the amount of menses from 12 annually to 4 annually and may improve her migraines.    Plan:  Take 90 active birth control pills in a row then take an active pills  Follow-up with family medicine regarding initiation of propranolol or triptans for further migraine management  Orders:  -     drospirenone-ethinyl estradiol (BIANCA) 3-0.02 MG per tablet; One tab p.o. daily, discard the placebo pills and begin a new pack.  You will be going through a pack every 3 we  3. Encounter for prescription of oral contraceptives  -     drospirenone-ethinyl estradiol (BIANCA) 3-0.02 MG per tablet; One tab p.o. daily, discard the placebo pills and begin a new pack.  You will be going through a pack every 3 we  4. Does not have health insurance  -     Ambulatory Referral to Social Work Care Management Program; Future  5. Vulvar itching  -     nystatin-triamcinolone (MYCOLOG-II) cream; Apply topically 2 (two) times a day          Pinky Angeles MD  09/10/24  6:58 PM

## 2024-09-10 NOTE — PATIENT INSTRUCTIONS
[facility] [address]  [address] [id number]  [phone]  FAX: [phone]     Recommend taking ocps continuously for 90 days and skipping inactive pills. After 90 days, take 1 week of inactive pills (this will be the week you have menses). You will have 4 periods in 1 year rather than 12 periods per year. This is safe and may decrease the frequency of your migraines.

## 2024-09-10 NOTE — ASSESSMENT & PLAN NOTE
Patient has history of menstrual migraines reports migraine week prior and week following her menses.  Patient prescribed Toshia birth control pill and instructed that she may take pack continuously skipping placebo pills.  Patient reports she was unsure about this and has continued to take placebo pills.  Advised patient to skip placebo pills for 90 days and then take placebo pills.  This will decrease the amount of menses from 12 annually to 4 annually and may improve her migraines.    Plan:  Take 90 active birth control pills in a row then take an active pills  Follow-up with family medicine regarding initiation of propranolol or triptans for further migraine management

## 2024-09-11 ENCOUNTER — PATIENT OUTREACH (OUTPATIENT)
Facility: HOSPITAL | Age: 39
End: 2024-09-11

## 2024-09-11 ENCOUNTER — PATIENT OUTREACH (OUTPATIENT)
Dept: OBGYN CLINIC | Facility: CLINIC | Age: 39
End: 2024-09-11

## 2024-09-11 DIAGNOSIS — E89.0 POSTOPERATIVE HYPOTHYROIDISM: ICD-10-CM

## 2024-09-11 NOTE — PROGRESS NOTES
Honey GARCIA received referral regarding patient not having health insurance. RADHA LUNDBERG also received message from provider indicating that patient was nervous about getting a breast ultrasound due to cost.     RADHA LUNDBERG placed call to the patient Sabine and discussed referral. She explained she previously had insurance through 's employer. She has not met with financial counselors yet. She was provided an application to complete. She is unsure if it was for the financial assistance at [facility] or for medical assistance. She does not currently have it with her.     Sabine works part time and her  is not currently working. RADHA LUNDBERG reviewed income limits for medical assistance for their household size. RADHA LUNDBERG advised may qualify and will send the application to her email along with the [facility] website if were to choose to apply online. She will compare if it is the same application given to her later. RADHA LUNDBERG confirmed email on chart. RADHA LUNDBERG did email this information following conversation.     RADHA LUNDBERG and Sabine discussed financial assistance for mammogram and ultrasound. She is agreeable for RADHA LUNDBERG sending information to the team responsible for the financial assistance. She was due last month but cancelled due to lack of insurance. Her  able to drive to [facility] where it is scheduled. RADHA LUNDBERG and Sabine discussed cost of medications. Last time birth control too much. It was $80 for 1 pack. It was while she was waiting to be seen for her annual visit. She got 90 day supply now but has not picked it up yet.    Sabine reported food security and financial security. The main stressor is the medical bills. She feels they are doing well overall.    Following conversation RADHA LUNDBERG contacted Sunny Max in [facility] and connected RADHA LUNDBERG with his team who informed RADHA LUNDBERG that they will be contacting patient.     RADHA LUNDBERG contacted [facility] for a price check. The medications cost $14.21 and  $69.88    RADHA Metz will remain available for psychosocial support as needed.

## 2024-09-12 RX ORDER — LEVOTHYROXINE SODIUM 25 UG/1
25 TABLET ORAL DAILY
Qty: 90 TABLET | Refills: 0 | Status: SHIPPED | OUTPATIENT
Start: 2024-09-12

## 2024-09-13 LAB
LAB AP GYN PRIMARY INTERPRETATION: NORMAL
Lab: NORMAL

## 2024-10-10 PROBLEM — Z01.419 ENCOUNTER FOR ANNUAL ROUTINE GYNECOLOGICAL EXAMINATION: Status: RESOLVED | Noted: 2024-09-10 | Resolved: 2024-10-10

## 2024-10-17 ENCOUNTER — HOSPITAL ENCOUNTER (OUTPATIENT)
Dept: ULTRASOUND IMAGING | Facility: CLINIC | Age: 39
End: 2024-10-17
Payer: OTHER GOVERNMENT

## 2024-10-17 ENCOUNTER — HOSPITAL ENCOUNTER (OUTPATIENT)
Dept: MAMMOGRAPHY | Facility: CLINIC | Age: 39
End: 2024-10-17
Payer: OTHER GOVERNMENT

## 2024-10-17 DIAGNOSIS — R92.8 ABNORMAL MAMMOGRAM: ICD-10-CM

## 2024-10-17 DIAGNOSIS — R92.8 ABNORMAL FINDING ON BREAST IMAGING: ICD-10-CM

## 2024-10-17 PROCEDURE — 76642 ULTRASOUND BREAST LIMITED: CPT

## 2024-10-18 NOTE — RESULT ENCOUNTER NOTE
Left breast ultrasound shows a stable mass.   Recommend a left breast ultrasound in one year. This has already been ordered and scheduled.   Continue with monthly breast self exam&  annual clinical breast exam .

## 2024-12-12 ENCOUNTER — TELEPHONE (OUTPATIENT)
Age: 39
End: 2024-12-12

## 2024-12-12 NOTE — TELEPHONE ENCOUNTER
Pt called in wanting to know if she had any labs to complete before her appt on , which I did confirm to her that she does. She also wanted to know if she would need to get an US done. She had a referral for it but states that it . Pt would like to know if she needs to have the US done prior to the appt.

## 2024-12-17 ENCOUNTER — RESULTS FOLLOW-UP (OUTPATIENT)
Dept: ENDOCRINOLOGY | Facility: CLINIC | Age: 39
End: 2024-12-17

## 2024-12-17 ENCOUNTER — APPOINTMENT (OUTPATIENT)
Dept: LAB | Facility: HOSPITAL | Age: 39
End: 2024-12-17

## 2024-12-17 DIAGNOSIS — Z85.850 HX OF PAPILLARY THYROID CARCINOMA: ICD-10-CM

## 2024-12-17 DIAGNOSIS — E89.0 POSTOPERATIVE HYPOTHYROIDISM: ICD-10-CM

## 2024-12-17 LAB
T4 FREE SERPL-MCNC: 0.82 NG/DL (ref 0.61–1.12)
TSH SERPL DL<=0.05 MIU/L-ACNC: 1.42 UIU/ML (ref 0.45–4.5)

## 2024-12-17 PROCEDURE — 84443 ASSAY THYROID STIM HORMONE: CPT

## 2024-12-17 PROCEDURE — 36415 COLL VENOUS BLD VENIPUNCTURE: CPT

## 2024-12-17 PROCEDURE — 84439 ASSAY OF FREE THYROXINE: CPT

## 2024-12-18 ENCOUNTER — OFFICE VISIT (OUTPATIENT)
Dept: ENDOCRINOLOGY | Facility: CLINIC | Age: 39
End: 2024-12-18

## 2024-12-18 VITALS
HEIGHT: 62 IN | DIASTOLIC BLOOD PRESSURE: 82 MMHG | SYSTOLIC BLOOD PRESSURE: 118 MMHG | BODY MASS INDEX: 22.89 KG/M2 | WEIGHT: 124.4 LBS | HEART RATE: 95 BPM

## 2024-12-18 DIAGNOSIS — E89.0 POSTOPERATIVE HYPOTHYROIDISM: Primary | ICD-10-CM

## 2024-12-18 DIAGNOSIS — Z85.850 HX OF PAPILLARY THYROID CARCINOMA: ICD-10-CM

## 2024-12-18 PROCEDURE — 99214 OFFICE O/P EST MOD 30 MIN: CPT

## 2024-12-18 RX ORDER — LEVOTHYROXINE SODIUM 25 UG/1
25 TABLET ORAL DAILY
Qty: 90 TABLET | Refills: 2 | Status: SHIPPED | OUTPATIENT
Start: 2024-12-18

## 2024-12-18 NOTE — ASSESSMENT & PLAN NOTE
Thyroid US has been stable since her surgery  No residual cancer - no nodules seen on right side of thyroid  Repeat thyroid US not warranted at this time

## 2024-12-18 NOTE — ASSESSMENT & PLAN NOTE
Thyroid function within target range   Continue current dose of levothyroxine  Orders:    levothyroxine 25 mcg tablet; Take 1 tablet (25 mcg total) by mouth daily    TSH, 3rd generation with Free T4 reflex; Future

## 2024-12-18 NOTE — PROGRESS NOTES
Established Patient Progress Note      Chief Complaint   Patient presents with    Hypothyroidism    Thyroid Cancer        Impression & Plan:    Assessment & Plan  Postoperative hypothyroidism  Thyroid function within target range   Continue current dose of levothyroxine  Orders:    levothyroxine 25 mcg tablet; Take 1 tablet (25 mcg total) by mouth daily    TSH, 3rd generation with Free T4 reflex; Future    Hx of papillary thyroid carcinoma  Thyroid US has been stable since her surgery  No residual cancer - no nodules seen on right side of thyroid  Repeat thyroid US not warranted at this time         History of Present Illness:   Sabine Love is a 39 y.o. female with a history of papillary thyroid CA and postoperative hypothyroidism.      She had a left herbie thyroidectomy in March 2016 for a 1.2 cm papillary thyroid CA.  She did not have a completion thyroidectomy or radioactive iodine therapy.  Ultrasound 11/2023 showed no recurrent disease.  Right sided thyroid ultrasound did not show any nodules.    For the hypothyroidism, She is taking levothyroxine 25mcg daily.  She does report often feeling cold but otherwise feels well with no symptoms of hypo/hyperthyroidism.  She is on OCP with no plans for pregnancy.       Patient Active Problem List   Diagnosis    Encounter for prescription of oral contraceptives    Vulvar irritation    Encounter for counseling regarding contraception    Non compliance w medication regimen    Vaginal discharge    Vulvar itching    Postoperative hypothyroidism    Hx of papillary thyroid carcinoma    Menstrual migraine without status migrainosus, not intractable    Pelvic pain    Bloating    Vaginal moniliasis    Surveillance for birth control, oral contraceptives      No past medical history on file.   Past Surgical History:   Procedure Laterality Date    THYROID SURGERY      US GUIDED BREAST BIOPSY LEFT COMPLETE Left 5/10/2023    US GUIDED THYROID BIOPSY  1/27/2016      Family History    Problem Relation Age of Onset    No Known Problems Mother     No Known Problems Father     No Known Problems Brother     No Known Problems Brother     No Known Problems Brother     No Known Problems Maternal Grandmother     No Known Problems Maternal Grandfather     No Known Problems Paternal Grandmother     No Known Problems Paternal Grandfather     No Known Problems Daughter     No Known Problems Son     No Known Problems Maternal Aunt     No Known Problems Maternal Aunt     No Known Problems Maternal Uncle     No Known Problems Maternal Uncle     No Known Problems Maternal Uncle     No Known Problems Paternal Aunt     No Known Problems Paternal Uncle     No Known Problems Paternal Uncle     No Known Problems Paternal Uncle     Breast cancer Neg Hx     Colon cancer Neg Hx     Endometrial cancer Neg Hx     Ovarian cancer Neg Hx     Heart attack Neg Hx     Stroke Neg Hx      Social History     Tobacco Use    Smoking status: Former    Smokeless tobacco: Current    Tobacco comments:     hooka daily in the summer, winter once per week   Substance Use Topics    Alcohol use: Not Currently     Allergies   Allergen Reactions    Acetaminophen Hives    Ciprofloxacin Hives     To be further evaluated by allergy specialist per Dr. Khan         Current Outpatient Medications:     drospirenone-ethinyl estradiol (BIANCA) 3-0.02 MG per tablet, One tab p.o. daily, discard the placebo pills and begin a new pack.  You will be going through a pack every 3 we, Disp: 28 tablet, Rfl: 11    levothyroxine 25 mcg tablet, Take 1 tablet (25 mcg total) by mouth daily, Disp: 90 tablet, Rfl: 2    nystatin-triamcinolone (MYCOLOG-II) cream, Apply topically 2 (two) times a day, Disp: 30 g, Rfl: 2    Review of Systems   Constitutional:  Negative for fatigue and unexpected weight change.   HENT:  Negative for sore throat, trouble swallowing and voice change.    Respiratory:  Negative for shortness of breath.    Cardiovascular:  Negative for chest  "pain and palpitations.   Gastrointestinal:  Negative for constipation and diarrhea.   Endocrine: Negative for cold intolerance and heat intolerance.   Neurological:  Negative for dizziness, tremors and weakness.   All other systems reviewed and are negative.      Physical Exam:  Body mass index is 22.75 kg/m².  /82 (BP Location: Right arm, Patient Position: Sitting, Cuff Size: Adult)   Pulse 95   Ht 5' 2\" (1.575 m)   Wt 56.4 kg (124 lb 6.4 oz)   BMI 22.75 kg/m²    Wt Readings from Last 3 Encounters:   12/18/24 56.4 kg (124 lb 6.4 oz)   09/10/24 54.1 kg (119 lb 3.2 oz)   09/07/23 51.2 kg (112 lb 12.8 oz)       Physical Exam  Vitals reviewed.   Constitutional:       Appearance: Normal appearance. She is normal weight.   Cardiovascular:      Rate and Rhythm: Normal rate.   Pulmonary:      Effort: Pulmonary effort is normal.   Neurological:      Mental Status: She is alert and oriented to person, place, and time.   Psychiatric:         Mood and Affect: Mood normal.         Labs:     Lab Results   Component Value Date    SKN4XHEECBHC 1.417 12/17/2024    OQZ0RTTYGOZI 0.607 09/06/2023    KUV9NXXVYMLJ 1.040 08/30/2021     Lab Results   Component Value Date    FREET4 0.82 12/17/2024       Orders Placed This Encounter   Procedures    TSH, 3rd generation with Free T4 reflex     Standing Status:   Future     Expected Date:   12/18/2025     Expiration Date:   12/18/2026       There are no Patient Instructions on file for this visit.    Discussed with the patient and all questioned fully answered. She will call me if any problems arise.    CHASIDY Miller  "

## 2025-02-18 ENCOUNTER — PATIENT OUTREACH (OUTPATIENT)
Dept: OBGYN CLINIC | Facility: CLINIC | Age: 40
End: 2025-02-18